# Patient Record
Sex: MALE | Race: WHITE | NOT HISPANIC OR LATINO | Employment: FULL TIME | URBAN - METROPOLITAN AREA
[De-identification: names, ages, dates, MRNs, and addresses within clinical notes are randomized per-mention and may not be internally consistent; named-entity substitution may affect disease eponyms.]

---

## 2023-06-29 LAB
ALANINE AMINOTRANSFERASE (SGPT) (U/L) IN SER/PLAS: 25 U/L (ref 10–52)
ALBUMIN (G/DL) IN SER/PLAS: 4.2 G/DL (ref 3.4–5)
ALBUMIN (MG/L) IN URINE: <7 MG/L
ALBUMIN/CREATININE (UG/MG) IN URINE: NORMAL UG/MG CRT (ref 0–30)
ALKALINE PHOSPHATASE (U/L) IN SER/PLAS: 43 U/L (ref 33–136)
ANION GAP IN SER/PLAS: 12 MMOL/L (ref 10–20)
APPEARANCE, URINE: CLEAR
ASPARTATE AMINOTRANSFERASE (SGOT) (U/L) IN SER/PLAS: 22 U/L (ref 9–39)
BASOPHILS (10*3/UL) IN BLOOD BY AUTOMATED COUNT: 0.07 X10E9/L (ref 0–0.1)
BASOPHILS/100 LEUKOCYTES IN BLOOD BY AUTOMATED COUNT: 0.8 % (ref 0–2)
BILIRUBIN TOTAL (MG/DL) IN SER/PLAS: 0.5 MG/DL (ref 0–1.2)
BILIRUBIN, URINE: NEGATIVE
BLOOD, URINE: NEGATIVE
CALCIDIOL (25 OH VITAMIN D3) (NG/ML) IN SER/PLAS: 49 NG/ML
CALCIUM (MG/DL) IN SER/PLAS: 9.5 MG/DL (ref 8.6–10.3)
CARBON DIOXIDE, TOTAL (MMOL/L) IN SER/PLAS: 28 MMOL/L (ref 21–32)
CHLORIDE (MMOL/L) IN SER/PLAS: 104 MMOL/L (ref 98–107)
CHOLESTEROL (MG/DL) IN SER/PLAS: 143 MG/DL (ref 0–199)
CHOLESTEROL IN HDL (MG/DL) IN SER/PLAS: 40.1 MG/DL
CHOLESTEROL/HDL RATIO: 3.6
COLOR, URINE: NORMAL
CREATININE (MG/DL) IN SER/PLAS: 0.96 MG/DL (ref 0.5–1.3)
CREATININE (MG/DL) IN URINE: 46.2 MG/DL (ref 20–370)
EOSINOPHILS (10*3/UL) IN BLOOD BY AUTOMATED COUNT: 0.17 X10E9/L (ref 0–0.4)
EOSINOPHILS/100 LEUKOCYTES IN BLOOD BY AUTOMATED COUNT: 2.1 % (ref 0–6)
ERYTHROCYTE DISTRIBUTION WIDTH (RATIO) BY AUTOMATED COUNT: 14.6 % (ref 11.5–14.5)
ERYTHROCYTE MEAN CORPUSCULAR HEMOGLOBIN CONCENTRATION (G/DL) BY AUTOMATED: 31.3 G/DL (ref 32–36)
ERYTHROCYTE MEAN CORPUSCULAR VOLUME (FL) BY AUTOMATED COUNT: 93 FL (ref 80–100)
ERYTHROCYTES (10*6/UL) IN BLOOD BY AUTOMATED COUNT: 5.1 X10E12/L (ref 4.5–5.9)
GFR MALE: 82 ML/MIN/1.73M2
GLUCOSE (MG/DL) IN SER/PLAS: 87 MG/DL (ref 74–99)
GLUCOSE, URINE: NEGATIVE MG/DL
HEMATOCRIT (%) IN BLOOD BY AUTOMATED COUNT: 47.3 % (ref 41–52)
HEMOGLOBIN (G/DL) IN BLOOD: 14.8 G/DL (ref 13.5–17.5)
IMMATURE GRANULOCYTES/100 LEUKOCYTES IN BLOOD BY AUTOMATED COUNT: 0.2 % (ref 0–0.9)
KETONES, URINE: NEGATIVE MG/DL
LDL: 83 MG/DL (ref 0–99)
LEUKOCYTE ESTERASE, URINE: NEGATIVE
LEUKOCYTES (10*3/UL) IN BLOOD BY AUTOMATED COUNT: 8.3 X10E9/L (ref 4.4–11.3)
LYMPHOCYTES (10*3/UL) IN BLOOD BY AUTOMATED COUNT: 2.67 X10E9/L (ref 0.8–3)
LYMPHOCYTES/100 LEUKOCYTES IN BLOOD BY AUTOMATED COUNT: 32.3 % (ref 13–44)
MONOCYTES (10*3/UL) IN BLOOD BY AUTOMATED COUNT: 0.72 X10E9/L (ref 0.05–0.8)
MONOCYTES/100 LEUKOCYTES IN BLOOD BY AUTOMATED COUNT: 8.7 % (ref 2–10)
NEUTROPHILS (10*3/UL) IN BLOOD BY AUTOMATED COUNT: 4.62 X10E9/L (ref 1.6–5.5)
NEUTROPHILS/100 LEUKOCYTES IN BLOOD BY AUTOMATED COUNT: 55.9 % (ref 40–80)
NITRITE, URINE: NEGATIVE
PH, URINE: 7 (ref 5–8)
PLATELETS (10*3/UL) IN BLOOD AUTOMATED COUNT: 187 X10E9/L (ref 150–450)
POTASSIUM (MMOL/L) IN SER/PLAS: 4.4 MMOL/L (ref 3.5–5.3)
PROTEIN TOTAL: 6.6 G/DL (ref 6.4–8.2)
PROTEIN, URINE: NEGATIVE MG/DL
SODIUM (MMOL/L) IN SER/PLAS: 140 MMOL/L (ref 136–145)
SPECIFIC GRAVITY, URINE: 1.01 (ref 1–1.03)
THYROTROPIN (MIU/L) IN SER/PLAS BY DETECTION LIMIT <= 0.05 MIU/L: 1.77 MIU/L (ref 0.44–3.98)
TRIGLYCERIDE (MG/DL) IN SER/PLAS: 100 MG/DL (ref 0–149)
UREA NITROGEN (MG/DL) IN SER/PLAS: 16 MG/DL (ref 6–23)
UROBILINOGEN, URINE: <2 MG/DL (ref 0–1.9)
VLDL: 20 MG/DL (ref 0–40)

## 2023-06-30 LAB
ESTIMATED AVERAGE GLUCOSE FOR HBA1C: 143 MG/DL
HEMOGLOBIN A1C/HEMOGLOBIN TOTAL IN BLOOD: 6.6 %

## 2023-07-08 LAB
PROSTATE SPECIFIC AG (NG/ML) IN SER/PLAS: 0.3 NG/ML (ref 0–4)
PROSTATE SPECIFIC AG FREE (NG/ML) IN SER/PLAS: 0.2 NG/ML
PROSTATE SPECIFIC AG FREE/PROSTATE SPECIFIC AG TOTAL IN SER/PLAS: 67 %

## 2023-09-28 LAB
ALBUMIN (MG/L) IN URINE: <7 MG/L
ALBUMIN/CREATININE (UG/MG) IN URINE: NORMAL UG/MG CRT (ref 0–30)
CREATININE (MG/DL) IN URINE: 134 MG/DL (ref 20–370)

## 2023-10-09 DIAGNOSIS — Z79.01 ANTICOAGULANT LONG-TERM USE: Primary | ICD-10-CM

## 2023-10-09 RX ORDER — WARFARIN SODIUM 5 MG/1
5 TABLET ORAL
COMMUNITY
Start: 2012-03-19 | End: 2023-10-09 | Stop reason: SDUPTHER

## 2023-10-09 RX ORDER — WARFARIN SODIUM 5 MG/1
5 TABLET ORAL EVERY EVENING
Qty: 90 TABLET | Refills: 0 | Status: SHIPPED | OUTPATIENT
Start: 2023-10-09 | End: 2023-10-31 | Stop reason: SDUPTHER

## 2023-10-29 PROBLEM — L71.9 ROSACEA: Status: ACTIVE | Noted: 2023-10-29

## 2023-10-29 PROBLEM — Z86.711 HISTORY OF PULMONARY EMBOLUS (PE): Status: ACTIVE | Noted: 2023-10-29

## 2023-10-29 PROBLEM — E66.9 CLASS 1 OBESITY WITH BODY MASS INDEX (BMI) OF 33.0 TO 33.9 IN ADULT: Status: ACTIVE | Noted: 2023-10-29

## 2023-10-29 PROBLEM — D68.51 FACTOR V LEIDEN MUTATION (MULTI): Status: ACTIVE | Noted: 2023-10-29

## 2023-10-29 PROBLEM — E66.811 CLASS 1 OBESITY WITH BODY MASS INDEX (BMI) OF 33.0 TO 33.9 IN ADULT: Status: ACTIVE | Noted: 2023-10-29

## 2023-10-29 PROBLEM — I10 HTN (HYPERTENSION): Status: ACTIVE | Noted: 2023-10-29

## 2023-10-29 PROBLEM — Q76.2 CONGENITAL SPONDYLOLISTHESIS OF LUMBAR REGION: Status: ACTIVE | Noted: 2023-10-29

## 2023-10-29 PROBLEM — E78.5 HYPERLIPIDEMIA: Status: ACTIVE | Noted: 2023-10-29

## 2023-10-29 PROBLEM — M17.9 OA (OSTEOARTHRITIS) OF KNEE: Status: ACTIVE | Noted: 2023-10-29

## 2023-10-29 PROBLEM — R91.8 LUNG NODULES: Status: ACTIVE | Noted: 2023-10-29

## 2023-10-29 PROBLEM — Z86.718 HISTORY OF DVT (DEEP VEIN THROMBOSIS): Status: ACTIVE | Noted: 2023-10-29

## 2023-10-29 PROBLEM — M51.26 LUMBAR HERNIATED DISC: Status: ACTIVE | Noted: 2023-10-29

## 2023-10-29 PROBLEM — K40.90 LEFT INGUINAL HERNIA: Status: ACTIVE | Noted: 2023-10-29

## 2023-10-29 RX ORDER — EPINEPHRINE 0.3 MG/.3ML
INJECTION INTRAMUSCULAR
COMMUNITY
Start: 2011-06-27 | End: 2024-06-05 | Stop reason: SDUPTHER

## 2023-10-29 RX ORDER — TIZANIDINE 4 MG/1
4 TABLET ORAL NIGHTLY PRN
COMMUNITY

## 2023-10-29 RX ORDER — KETOCONAZOLE 20 MG/ML
SHAMPOO, SUSPENSION TOPICAL
COMMUNITY
Start: 2021-06-29

## 2023-10-29 RX ORDER — CHOLECALCIFEROL (VITAMIN D3) 25 MCG
1000 TABLET ORAL DAILY
COMMUNITY

## 2023-10-29 RX ORDER — LORATADINE 10 MG/1
10 TABLET ORAL DAILY
COMMUNITY

## 2023-10-29 RX ORDER — LOSARTAN POTASSIUM 25 MG/1
TABLET ORAL
COMMUNITY
Start: 2023-02-08

## 2023-10-29 RX ORDER — PREGABALIN 75 MG/1
CAPSULE ORAL
COMMUNITY

## 2023-10-29 RX ORDER — ATORVASTATIN CALCIUM 20 MG/1
TABLET, FILM COATED ORAL
COMMUNITY
Start: 2012-10-02 | End: 2024-03-26 | Stop reason: SDUPTHER

## 2023-10-29 RX ORDER — TAPENTADOL HYDROCHLORIDE 50 MG/1
TABLET, FILM COATED ORAL
COMMUNITY
Start: 2023-08-17 | End: 2023-11-21 | Stop reason: ALTCHOICE

## 2023-10-29 RX ORDER — FUROSEMIDE 20 MG/1
TABLET ORAL
COMMUNITY
End: 2024-01-24 | Stop reason: SDUPTHER

## 2023-10-29 RX ORDER — FLUTICASONE PROPIONATE 50 MCG
SPRAY, SUSPENSION (ML) NASAL
COMMUNITY

## 2023-10-29 RX ORDER — NALOXONE HYDROCHLORIDE 4 MG/.1ML
SPRAY NASAL
COMMUNITY

## 2023-10-29 RX ORDER — ASPIRIN 81 MG/1
81 TABLET ORAL
COMMUNITY
Start: 2011-05-09

## 2023-10-31 ENCOUNTER — OFFICE VISIT (OUTPATIENT)
Dept: PRIMARY CARE | Facility: CLINIC | Age: 78
End: 2023-10-31
Payer: MEDICARE

## 2023-10-31 VITALS
DIASTOLIC BLOOD PRESSURE: 70 MMHG | HEART RATE: 73 BPM | SYSTOLIC BLOOD PRESSURE: 136 MMHG | BODY MASS INDEX: 33.72 KG/M2 | WEIGHT: 235 LBS | TEMPERATURE: 97.9 F

## 2023-10-31 DIAGNOSIS — Z86.711 HISTORY OF PULMONARY EMBOLUS (PE): ICD-10-CM

## 2023-10-31 DIAGNOSIS — I48.0 PAROXYSMAL ATRIAL FIBRILLATION (MULTI): Primary | ICD-10-CM

## 2023-10-31 DIAGNOSIS — Z79.01 ANTICOAGULANT LONG-TERM USE: ICD-10-CM

## 2023-10-31 LAB
POC INR: 3
POC PROTHROMBIN TIME: NORMAL

## 2023-10-31 PROCEDURE — 1160F RVW MEDS BY RX/DR IN RCRD: CPT | Performed by: NURSE PRACTITIONER

## 2023-10-31 PROCEDURE — 85610 PROTHROMBIN TIME: CPT | Performed by: NURSE PRACTITIONER

## 2023-10-31 PROCEDURE — 3078F DIAST BP <80 MM HG: CPT | Performed by: NURSE PRACTITIONER

## 2023-10-31 PROCEDURE — 1159F MED LIST DOCD IN RCRD: CPT | Performed by: NURSE PRACTITIONER

## 2023-10-31 PROCEDURE — 1036F TOBACCO NON-USER: CPT | Performed by: NURSE PRACTITIONER

## 2023-10-31 PROCEDURE — 99213 OFFICE O/P EST LOW 20 MIN: CPT | Performed by: NURSE PRACTITIONER

## 2023-10-31 PROCEDURE — 3075F SYST BP GE 130 - 139MM HG: CPT | Performed by: NURSE PRACTITIONER

## 2023-10-31 ASSESSMENT — ENCOUNTER SYMPTOMS
RESPIRATORY NEGATIVE: 1
GASTROINTESTINAL NEGATIVE: 1
CONSTITUTIONAL NEGATIVE: 1
CARDIOVASCULAR NEGATIVE: 1

## 2023-10-31 NOTE — PROGRESS NOTES
Subjective   Patient ID: Anurag Cummings Jr. is a 78 y.o. male.    HPI  Here for inr check  Last inr was 2.9 on warfarin 7.5mg daily  He adjusted diet and 1 day hold then went back to 7.5mg all days  No cp, sob, pressure, pain or swelling and no bleeding  He has been in contact with dr reyes [facility] his cards for his   Lone event of afib and has not had any of those s/s since    Inr today is 3.0  Will eat more greens in diet and inr check 3 weeks     Lab Results   Component Value Date    INR 3.00 10/31/2023    /70   Pulse 73   Temp 36.6 °C (97.9 °F)   Wt 107 kg (235 lb)   BMI 33.72 kg/m²      Review of Systems   Constitutional: Negative.    Respiratory: Negative.     Cardiovascular: Negative.    Gastrointestinal: Negative.    Skin: Negative.        Objective   Physical Exam  Vitals and nursing note reviewed.   Constitutional:       Appearance: Normal appearance.   HENT:      Head: Normocephalic.   Eyes:      Pupils: Pupils are equal, round, and reactive to light.   Cardiovascular:      Rate and Rhythm: Normal rate and regular rhythm.      Heart sounds: Normal heart sounds.   Pulmonary:      Effort: Pulmonary effort is normal.      Breath sounds: Normal breath sounds.   Skin:     General: Skin is warm and dry.   Neurological:      General: No focal deficit present.      Mental Status: He is alert and oriented to person, place, and time. Mental status is at baseline.   Psychiatric:         Mood and Affect: Mood normal.         Behavior: Behavior normal.         Thought Content: Thought content normal.         Judgment: Judgment normal.     Problem List Items Addressed This Visit             ICD-10-CM    History of pulmonary embolus (PE) Z86.711     Other Visit Diagnoses         Codes    Paroxysmal atrial fibrillation (CMS/HCC)    -  Primary I48.0

## 2023-11-07 RX ORDER — WARFARIN SODIUM 5 MG/1
TABLET ORAL
Qty: 90 TABLET | Refills: 0 | Status: SHIPPED | OUTPATIENT
Start: 2023-11-07 | End: 2024-01-10

## 2023-11-21 ENCOUNTER — OFFICE VISIT (OUTPATIENT)
Dept: PRIMARY CARE | Facility: CLINIC | Age: 78
End: 2023-11-21
Payer: MEDICARE

## 2023-11-21 VITALS
WEIGHT: 236 LBS | RESPIRATION RATE: 16 BRPM | DIASTOLIC BLOOD PRESSURE: 80 MMHG | BODY MASS INDEX: 33.86 KG/M2 | HEART RATE: 66 BPM | SYSTOLIC BLOOD PRESSURE: 120 MMHG | TEMPERATURE: 98.5 F

## 2023-11-21 DIAGNOSIS — Z86.711 HISTORY OF PULMONARY EMBOLUS (PE): ICD-10-CM

## 2023-11-21 DIAGNOSIS — Z79.01 ANTICOAGULANT LONG-TERM USE: ICD-10-CM

## 2023-11-21 DIAGNOSIS — I48.0 PAROXYSMAL ATRIAL FIBRILLATION (MULTI): ICD-10-CM

## 2023-11-21 DIAGNOSIS — Z86.718 HISTORY OF DVT (DEEP VEIN THROMBOSIS): ICD-10-CM

## 2023-11-21 DIAGNOSIS — D68.59 HYPERCOAGULABLE STATE (MULTI): Primary | ICD-10-CM

## 2023-11-21 LAB
POC INR: 3.1
POC PROTHROMBIN TIME: NORMAL

## 2023-11-21 PROCEDURE — 3074F SYST BP LT 130 MM HG: CPT | Performed by: NURSE PRACTITIONER

## 2023-11-21 PROCEDURE — 1160F RVW MEDS BY RX/DR IN RCRD: CPT | Performed by: NURSE PRACTITIONER

## 2023-11-21 PROCEDURE — 3079F DIAST BP 80-89 MM HG: CPT | Performed by: NURSE PRACTITIONER

## 2023-11-21 PROCEDURE — 99213 OFFICE O/P EST LOW 20 MIN: CPT | Performed by: NURSE PRACTITIONER

## 2023-11-21 PROCEDURE — 1036F TOBACCO NON-USER: CPT | Performed by: NURSE PRACTITIONER

## 2023-11-21 PROCEDURE — 85610 PROTHROMBIN TIME: CPT | Performed by: NURSE PRACTITIONER

## 2023-11-21 PROCEDURE — 1159F MED LIST DOCD IN RCRD: CPT | Performed by: NURSE PRACTITIONER

## 2023-11-21 ASSESSMENT — ENCOUNTER SYMPTOMS
NEUROLOGICAL NEGATIVE: 1
PSYCHIATRIC NEGATIVE: 1
CARDIOVASCULAR NEGATIVE: 1
RESPIRATORY NEGATIVE: 1
CONSTITUTIONAL NEGATIVE: 1
GASTROINTESTINAL NEGATIVE: 1

## 2023-11-21 NOTE — PROGRESS NOTES
Subjective   Patient ID: Anurag Cummings Jr. is a 78 y.o. male.    HPI  Feels well  Here for inr check  Today inr is elevated he says d/t holiday/get togethers  No cp, sob, pressure, pain or swelling  He will take 1/2 dose today and return to his 7.5mg daily  Has had no greens in diet will readd  Will follow up one month    Lab Results   Component Value Date    INR 3.10 11/21/2023    INR 3.00 10/31/2023      Review of Systems   Constitutional: Negative.    Respiratory: Negative.     Cardiovascular: Negative.    Gastrointestinal: Negative.    Skin: Negative.    Neurological: Negative.    Psychiatric/Behavioral: Negative.       Visit Vitals  /80   Pulse 66   Temp 36.9 °C (98.5 °F)   Resp 16      Vitals:    11/21/23 1358   Weight: 107 kg (236 lb)        Objective   Physical Exam  Vitals and nursing note reviewed.   Constitutional:       Appearance: Normal appearance.   HENT:      Head: Normocephalic.   Eyes:      Pupils: Pupils are equal, round, and reactive to light.   Cardiovascular:      Rate and Rhythm: Normal rate and regular rhythm.      Heart sounds: Normal heart sounds.   Pulmonary:      Effort: Pulmonary effort is normal.      Breath sounds: Normal breath sounds.   Skin:     General: Skin is warm and dry.   Neurological:      General: No focal deficit present.      Mental Status: He is alert and oriented to person, place, and time. Mental status is at baseline.   Psychiatric:         Mood and Affect: Mood normal.         Behavior: Behavior normal.         Thought Content: Thought content normal.         Judgment: Judgment normal.     Problem List Items Addressed This Visit             ICD-10-CM    History of DVT (deep vein thrombosis) Z86.718    History of pulmonary embolus (PE) Z86.711     Other Visit Diagnoses         Codes    Hypercoagulable state (CMS/HCC)    -  Primary D68.59    Paroxysmal atrial fibrillation (CMS/HCC)     I48.0    Anticoagulant long-term use     Z79.01

## 2023-11-21 NOTE — PROGRESS NOTES
Anurag Cummings  is a 78 y.o. here for a diabetic follow up exam. inr    Pt states they are doing well. Following a low carbohydrate diet and is active.     Up to date with eye and foot exams, pcp visits.       Hold 1/2 tablet and then back to 7.5mg all days   Weight 139    Visit Vitals  /80   Pulse 66   Temp 36.9 °C (98.5 °F)   Resp 16    .weight  Lab Results   Component Value Date    HGBA1C 6.6 (A) 06/29/2023   (  Vitals:    11/21/23 1358   Weight: 107 kg (236 lb)      Visit Vitals  /80   Pulse 66   Temp 36.9 °C (98.5 °F)   Resp 16        Current Outpatient Medications on File Prior to Visit   Medication Sig Dispense Refill    aspirin 81 mg EC tablet Take 1 tablet (81 mg) by mouth once daily.      atorvastatin (Lipitor) 20 mg tablet       cholecalciferol (Vitamin D-3) 25 MCG (1000 UT) tablet Take 1 tablet (1,000 Units) by mouth once daily.      EPINEPHrine (EpiPen 2-Reid) 0.3 mg/0.3 mL injection syringe As directed      fluticasone (Flonase) 50 mcg/actuation nasal spray Flonase      furosemide (Lasix) 20 mg tablet take one tablet by mouth on mon, wed, and fri      loratadine (Claritin) 10 mg tablet Take 1 tablet (10 mg) by mouth once daily.      losartan (Cozaar) 25 mg tablet take 1 tablet by mouth in the am and 1 tablet in pm d/c 50mg      metoprolol succinate XL (Kapspargo Sprinkle) 25 mg 24 hr capsule Take 1 capsule (25 mg) by mouth once daily.      naloxone (Narcan) 4 mg/0.1 mL nasal spray take 0.1 ml Nasally call 911 immediately, when breathing is severely diminished. may repeat in 3 minutes if necessary for 30 days      pregabalin (Lyrica) 75 mg capsule TAKE 1 CAPSULE BY MOUTH ONCE DAILY AT BEDTIME      tapentadol ER (Nucynta ER) 50 mg 12 hr tablet Take 1 tablet (50 mg) by mouth once daily.      tiZANidine (Zanaflex) 4 mg tablet Take 1 tablet (4 mg) by mouth as needed at bedtime.      warfarin (Coumadin) 5 mg tablet 1.5 tabs daily =7.5 total daily dose 90 tablet 0    ketoconazole (NIZOral)  2 % shampoo        [DISCONTINUED] Nucynta 50 mg tablet TAKE 1 TABLET BY MOUTH EVERY 8 TO 12 HOURS AS NEEDED       No current facility-administered medications on file prior to visit.      Review of Systems   Physical Exam     Assessment and Plan:  No problem-specific Assessment & Plan notes found for this encounter.     Problem List Items Addressed This Visit    None

## 2023-12-21 ENCOUNTER — OFFICE VISIT (OUTPATIENT)
Dept: PRIMARY CARE | Facility: CLINIC | Age: 78
End: 2023-12-21
Payer: MEDICARE

## 2023-12-21 VITALS
DIASTOLIC BLOOD PRESSURE: 84 MMHG | TEMPERATURE: 97.6 F | BODY MASS INDEX: 33.86 KG/M2 | SYSTOLIC BLOOD PRESSURE: 130 MMHG | WEIGHT: 236 LBS | HEART RATE: 66 BPM

## 2023-12-21 DIAGNOSIS — D68.59 HYPERCOAGULABLE STATE (MULTI): ICD-10-CM

## 2023-12-21 DIAGNOSIS — Z79.01 ANTICOAGULANT LONG-TERM USE: Primary | ICD-10-CM

## 2023-12-21 LAB
POC INR: 3.2
POC PROTHROMBIN TIME: NORMAL

## 2023-12-21 PROCEDURE — 85610 PROTHROMBIN TIME: CPT | Performed by: NURSE PRACTITIONER

## 2023-12-21 PROCEDURE — 3079F DIAST BP 80-89 MM HG: CPT | Performed by: NURSE PRACTITIONER

## 2023-12-21 PROCEDURE — 1159F MED LIST DOCD IN RCRD: CPT | Performed by: NURSE PRACTITIONER

## 2023-12-21 PROCEDURE — 99213 OFFICE O/P EST LOW 20 MIN: CPT | Performed by: NURSE PRACTITIONER

## 2023-12-21 PROCEDURE — 1036F TOBACCO NON-USER: CPT | Performed by: NURSE PRACTITIONER

## 2023-12-21 PROCEDURE — 3075F SYST BP GE 130 - 139MM HG: CPT | Performed by: NURSE PRACTITIONER

## 2023-12-21 PROCEDURE — 1160F RVW MEDS BY RX/DR IN RCRD: CPT | Performed by: NURSE PRACTITIONER

## 2023-12-21 ASSESSMENT — ENCOUNTER SYMPTOMS
CARDIOVASCULAR NEGATIVE: 1
RESPIRATORY NEGATIVE: 1
GASTROINTESTINAL NEGATIVE: 1
ENDOCRINE NEGATIVE: 1
CONSTITUTIONAL NEGATIVE: 1
NEUROLOGICAL NEGATIVE: 1
PSYCHIATRIC NEGATIVE: 1

## 2023-12-21 NOTE — PROGRESS NOTES
Anurag Leavittjose luis Carver. male  78 y.o. for long term use of anticoagulation visit.    Review of Systems   Constitutional: Negative.    Respiratory: Negative.     Cardiovascular: Negative.    Gastrointestinal: Negative.    Endocrine: Negative.    Skin: Negative.    Neurological: Negative.    Psychiatric/Behavioral: Negative.        HPI   Here for inr  Check. Inr today is 3.2  Last inr 3.1 (3.0)  Warfarin 7.5mg all days  No cp, sob, pressure, pain or swelling and no bleeding     He plans on eating 2-3 more servings of greens weekly and follow up in 3-4 weeks. He has had no greens over last few weeks.        Physical Exam  Vitals and nursing note reviewed.   Constitutional:       Appearance: Normal appearance.   HENT:      Head: Normocephalic.   Eyes:      Pupils: Pupils are equal, round, and reactive to light.   Cardiovascular:      Rate and Rhythm: Normal rate and regular rhythm.      Heart sounds: Normal heart sounds.   Pulmonary:      Effort: Pulmonary effort is normal.      Breath sounds: Normal breath sounds.   Skin:     General: Skin is warm and dry.   Neurological:      General: No focal deficit present.      Mental Status: He is alert and oriented to person, place, and time. Mental status is at baseline.   Psychiatric:         Mood and Affect: Mood normal.         Behavior: Behavior normal.         Thought Content: Thought content normal.         Judgment: Judgment normal.        Visit Vitals  /84   Pulse 66   Temp 36.4 °C (97.6 °F)      Vitals:    12/21/23 1315   Weight: 107 kg (236 lb)        History reviewed. No pertinent past medical history.     Past Surgical History:   Procedure Laterality Date    OTHER SURGICAL HISTORY  10/01/2020    Umbilical hernia repair    OTHER SURGICAL HISTORY  10/01/2020    Eye surgery    OTHER SURGICAL HISTORY  10/01/2020    Mely filter placement    OTHER SURGICAL HISTORY  10/01/2020    Tonsillectomy    OTHER SURGICAL HISTORY  10/01/2020    Cataract surgery    OTHER  SURGICAL HISTORY  11/24/2020    Knee surgery        Anticoagulation Episode Summary       Current INR goal:     TTR:  --   Next INR check:     INR from last check:  3.20 (12/21/2023)   Weekly max warfarin dose:     Target end date:     INR check location:     Preferred lab:     Send INR reminders to:         Comments:                Problem List Items Addressed This Visit    None  Visit Diagnoses         Codes    Anticoagulant long-term use    -  Primary Z79.01    Hypercoagulable state (CMS/HCC)     D68.59    Relevant Orders    POCT INR manually resulted (Completed)

## 2024-01-10 DIAGNOSIS — Z79.01 ANTICOAGULANT LONG-TERM USE: ICD-10-CM

## 2024-01-10 RX ORDER — WARFARIN SODIUM 5 MG/1
TABLET ORAL
Qty: 90 TABLET | Refills: 3 | Status: SHIPPED | OUTPATIENT
Start: 2024-01-10 | End: 2024-03-26 | Stop reason: SDUPTHER

## 2024-01-24 ENCOUNTER — OFFICE VISIT (OUTPATIENT)
Dept: PRIMARY CARE | Facility: CLINIC | Age: 79
End: 2024-01-24
Payer: MEDICARE

## 2024-01-24 VITALS
TEMPERATURE: 98.5 F | HEART RATE: 68 BPM | SYSTOLIC BLOOD PRESSURE: 150 MMHG | DIASTOLIC BLOOD PRESSURE: 72 MMHG | BODY MASS INDEX: 34.15 KG/M2 | WEIGHT: 238 LBS

## 2024-01-24 DIAGNOSIS — R73.03 PREDIABETES: ICD-10-CM

## 2024-01-24 DIAGNOSIS — D68.59 HYPERCOAGULABLE STATE (MULTI): Primary | ICD-10-CM

## 2024-01-24 LAB
POC FINGERSTICK BLOOD GLUCOSE: 84 MG/DL (ref 70–100)
POC HEMOGLOBIN A1C: 6.5 % (ref 4.2–6.5)
POC INR: 3.6
POC PROTHROMBIN TIME: NORMAL

## 2024-01-24 PROCEDURE — 83036 HEMOGLOBIN GLYCOSYLATED A1C: CPT | Performed by: NURSE PRACTITIONER

## 2024-01-24 PROCEDURE — 3077F SYST BP >= 140 MM HG: CPT | Performed by: NURSE PRACTITIONER

## 2024-01-24 PROCEDURE — 99214 OFFICE O/P EST MOD 30 MIN: CPT | Performed by: NURSE PRACTITIONER

## 2024-01-24 PROCEDURE — 3078F DIAST BP <80 MM HG: CPT | Performed by: NURSE PRACTITIONER

## 2024-01-24 PROCEDURE — 1159F MED LIST DOCD IN RCRD: CPT | Performed by: NURSE PRACTITIONER

## 2024-01-24 PROCEDURE — 1036F TOBACCO NON-USER: CPT | Performed by: NURSE PRACTITIONER

## 2024-01-24 PROCEDURE — 85610 PROTHROMBIN TIME: CPT | Performed by: NURSE PRACTITIONER

## 2024-01-24 PROCEDURE — 82962 GLUCOSE BLOOD TEST: CPT | Performed by: NURSE PRACTITIONER

## 2024-01-24 RX ORDER — FUROSEMIDE 20 MG/1
TABLET ORAL
Qty: 90 TABLET | Refills: 3 | Status: SHIPPED | OUTPATIENT
Start: 2024-01-24

## 2024-01-24 NOTE — PROGRESS NOTES
Anurag Cummings . is a 78 y.o. here for a diabetic follow up exam.     Pt states they are doing well. Following a low carbohydrate diet and is active.     Up to date with eye and foot exams, pcp visits.     Inr ntoday 3.6 hold today then back to   7.5mg all days except 5mg on Sundays  Follow up one month   On decong and will nwatch the b/p   Visit Vitals  /72   Pulse 68   Temp 36.9 °C (98.5 °F)        Lab Results   Component Value Date    POCGLU 84 01/24/2024    HGBA1C 6.5 01/24/2024    HGBA1C 6.6 (A) 06/29/2023   (  [unfilled]   Visit Vitals  /72   Pulse 68   Temp 36.9 °C (98.5 °F)        Current Outpatient Medications on File Prior to Visit   Medication Sig Dispense Refill    aspirin 81 mg EC tablet Take 1 tablet (81 mg) by mouth once daily.      atorvastatin (Lipitor) 20 mg tablet       cholecalciferol (Vitamin D-3) 25 MCG (1000 UT) tablet Take 1 tablet (1,000 Units) by mouth once daily.      EPINEPHrine (EpiPen 2-Reid) 0.3 mg/0.3 mL injection syringe As directed      fluticasone (Flonase) 50 mcg/actuation nasal spray Flonase      furosemide (Lasix) 20 mg tablet take one tablet by mouth on mon, wed, and fri      ketoconazole (NIZOral) 2 % shampoo        loratadine (Claritin) 10 mg tablet Take 1 tablet (10 mg) by mouth once daily.      losartan (Cozaar) 25 mg tablet take 1 tablet by mouth in the am and 1 tablet in pm d/c 50mg      metoprolol succinate XL (Kapspargo Sprinkle) 25 mg 24 hr capsule Take 1 capsule (25 mg) by mouth once daily.      naloxone (Narcan) 4 mg/0.1 mL nasal spray take 0.1 ml Nasally call 911 immediately, when breathing is severely diminished. may repeat in 3 minutes if necessary for 30 days      pregabalin (Lyrica) 75 mg capsule TAKE 1 CAPSULE BY MOUTH ONCE DAILY AT BEDTIME      tapentadol ER (Nucynta ER) 50 mg 12 hr tablet Take 1 tablet (50 mg) by mouth once daily.      tiZANidine (Zanaflex) 4 mg tablet Take 1 tablet (4 mg) by mouth as needed at bedtime.      warfarin  (Coumadin) 5 mg tablet TAKE 1 TABLET DAILY IN THE EVENING 90 tablet 3     No current facility-administered medications on file prior to visit.      Review of Systems     Physical Exam     Assessment and Plan:  No problem-specific Assessment & Plan notes found for this encounter.     Problem List Items Addressed This Visit    None  Visit Diagnoses         Codes    Hypercoagulable state (CMS/[facility])    -  Primary D68.59    Relevant Orders    POCT INR manually resulted    POCT INR manually resulted (Completed)    Prediabetes     R73.03    Relevant Orders    POCT Fingerstick Glucose manually resulted (Completed)    POCT glycosylated hemoglobin (Hb A1C) manually resulted (Completed)    POCT INR manually resulted (Completed)

## 2024-02-23 ENCOUNTER — OFFICE VISIT (OUTPATIENT)
Dept: PRIMARY CARE | Facility: CLINIC | Age: 79
End: 2024-02-23
Payer: MEDICARE

## 2024-02-23 VITALS
TEMPERATURE: 97.4 F | HEART RATE: 70 BPM | RESPIRATION RATE: 16 BRPM | SYSTOLIC BLOOD PRESSURE: 122 MMHG | OXYGEN SATURATION: 97 % | BODY MASS INDEX: 32.71 KG/M2 | DIASTOLIC BLOOD PRESSURE: 80 MMHG | WEIGHT: 228 LBS

## 2024-02-23 DIAGNOSIS — Z86.711 HISTORY OF PULMONARY EMBOLUS (PE): Primary | ICD-10-CM

## 2024-02-23 DIAGNOSIS — Z86.718 HISTORY OF DVT (DEEP VEIN THROMBOSIS): ICD-10-CM

## 2024-02-23 DIAGNOSIS — Z79.01 ANTICOAGULANT LONG-TERM USE: ICD-10-CM

## 2024-02-23 LAB
POC INR: 2
POC PROTHROMBIN TIME: NORMAL

## 2024-02-23 PROCEDURE — 3079F DIAST BP 80-89 MM HG: CPT | Performed by: NURSE PRACTITIONER

## 2024-02-23 PROCEDURE — 1036F TOBACCO NON-USER: CPT | Performed by: NURSE PRACTITIONER

## 2024-02-23 PROCEDURE — 3074F SYST BP LT 130 MM HG: CPT | Performed by: NURSE PRACTITIONER

## 2024-02-23 PROCEDURE — 1160F RVW MEDS BY RX/DR IN RCRD: CPT | Performed by: NURSE PRACTITIONER

## 2024-02-23 PROCEDURE — 1159F MED LIST DOCD IN RCRD: CPT | Performed by: NURSE PRACTITIONER

## 2024-02-23 PROCEDURE — 99213 OFFICE O/P EST LOW 20 MIN: CPT | Performed by: NURSE PRACTITIONER

## 2024-02-23 PROCEDURE — 85610 PROTHROMBIN TIME: CPT | Performed by: NURSE PRACTITIONER

## 2024-02-23 ASSESSMENT — ENCOUNTER SYMPTOMS
PSYCHIATRIC NEGATIVE: 1
NEUROLOGICAL NEGATIVE: 1
ENDOCRINE NEGATIVE: 1
MUSCULOSKELETAL NEGATIVE: 1
GASTROINTESTINAL NEGATIVE: 1
CONSTITUTIONAL NEGATIVE: 1
CARDIOVASCULAR NEGATIVE: 1
RESPIRATORY NEGATIVE: 1

## 2024-02-23 NOTE — PROGRESS NOTES
Anurag Cummings Jr. male  78 y.o. for long term use of anticoagulation visit.     HPI   Here for inr check  Feels well no cp, sob, pressure, pain or swelling   No bleeding  Has seen cards and utd there  Last inr 3.2  He held then back to 7.5mg all days except Sunday no dose  Put greens back into his diet  1/24 aic 6.5  Inr today 2.10  Going to [place] coming back one week     Visit Vitals  /80   Pulse 70   Temp 36.3 °C (97.4 °F) (Temporal)   Resp 16         Review of Systems   Constitutional: Negative.    HENT: Negative.     Respiratory: Negative.     Cardiovascular: Negative.    Gastrointestinal: Negative.    Endocrine: Negative.    Genitourinary: Negative.    Musculoskeletal: Negative.    Skin: Negative.    Neurological: Negative.    Psychiatric/Behavioral: Negative.         Physical Exam  Vitals and nursing note reviewed.   Constitutional:       Appearance: Normal appearance.   HENT:      Head: Normocephalic.   Eyes:      Pupils: Pupils are equal, round, and reactive to light.   Cardiovascular:      Rate and Rhythm: Normal rate and regular rhythm.      Heart sounds: Normal heart sounds.   Pulmonary:      Effort: Pulmonary effort is normal.      Breath sounds: Normal breath sounds.   Skin:     General: Skin is warm and dry.   Neurological:      General: No focal deficit present.      Mental Status: He is alert and oriented to person, place, and time. Mental status is at baseline.   Psychiatric:         Mood and Affect: Mood normal.         Behavior: Behavior normal.         Thought Content: Thought content normal.         Judgment: Judgment normal.      Problem List Items Addressed This Visit             ICD-10-CM    History of DVT (deep vein thrombosis) Z86.718    History of pulmonary embolus (PE) - Primary Z86.711     Other Visit Diagnoses         Codes    Anticoagulant long-term use     Z79.01           There were no vitals taken for this visit. There were no vitals filed for this visit.      Anticoagulation Episode Summary       Current INR goal:     TTR:  --   Next INR check:     INR from last check:  3.60 (1/24/2024)   Weekly max warfarin dose:     Target end date:     INR check location:     Preferred lab:     Send INR reminders to:         Comments:                Problem List Items Addressed This Visit             ICD-10-CM    History of DVT (deep vein thrombosis) Z86.718    History of pulmonary embolus (PE) - Primary Z86.711     Other Visit Diagnoses         Codes    Anticoagulant long-term use     Z79.01

## 2024-03-26 ENCOUNTER — OFFICE VISIT (OUTPATIENT)
Dept: PRIMARY CARE | Facility: CLINIC | Age: 79
End: 2024-03-26
Payer: MEDICARE

## 2024-03-26 VITALS
SYSTOLIC BLOOD PRESSURE: 122 MMHG | HEART RATE: 64 BPM | WEIGHT: 228 LBS | RESPIRATION RATE: 16 BRPM | BODY MASS INDEX: 32.71 KG/M2 | DIASTOLIC BLOOD PRESSURE: 80 MMHG | TEMPERATURE: 97.2 F

## 2024-03-26 DIAGNOSIS — Z79.01 ANTICOAGULANT LONG-TERM USE: Primary | ICD-10-CM

## 2024-03-26 DIAGNOSIS — D68.59 HYPERCOAGULABLE STATE (MULTI): ICD-10-CM

## 2024-03-26 DIAGNOSIS — E78.5 HYPERLIPIDEMIA, UNSPECIFIED HYPERLIPIDEMIA TYPE: ICD-10-CM

## 2024-03-26 LAB
POC INR: 2.4
POC PROTHROMBIN TIME: NORMAL

## 2024-03-26 PROCEDURE — 85610 PROTHROMBIN TIME: CPT | Performed by: NURSE PRACTITIONER

## 2024-03-26 PROCEDURE — 3074F SYST BP LT 130 MM HG: CPT | Performed by: NURSE PRACTITIONER

## 2024-03-26 PROCEDURE — 99213 OFFICE O/P EST LOW 20 MIN: CPT | Performed by: NURSE PRACTITIONER

## 2024-03-26 PROCEDURE — 1160F RVW MEDS BY RX/DR IN RCRD: CPT | Performed by: NURSE PRACTITIONER

## 2024-03-26 PROCEDURE — 1036F TOBACCO NON-USER: CPT | Performed by: NURSE PRACTITIONER

## 2024-03-26 PROCEDURE — 3079F DIAST BP 80-89 MM HG: CPT | Performed by: NURSE PRACTITIONER

## 2024-03-26 PROCEDURE — 1159F MED LIST DOCD IN RCRD: CPT | Performed by: NURSE PRACTITIONER

## 2024-03-26 RX ORDER — WARFARIN SODIUM 5 MG/1
TABLET ORAL
Qty: 90 TABLET | Refills: 3 | Status: SHIPPED | OUTPATIENT
Start: 2024-03-26

## 2024-03-26 RX ORDER — ATORVASTATIN CALCIUM 20 MG/1
TABLET, FILM COATED ORAL
Qty: 90 TABLET | Refills: 3 | Status: SHIPPED | OUTPATIENT
Start: 2024-03-26

## 2024-03-26 ASSESSMENT — ENCOUNTER SYMPTOMS
RESPIRATORY NEGATIVE: 1
CONSTITUTIONAL NEGATIVE: 1
PSYCHIATRIC NEGATIVE: 1
ENDOCRINE NEGATIVE: 1
MUSCULOSKELETAL NEGATIVE: 1
NEUROLOGICAL NEGATIVE: 1
CARDIOVASCULAR NEGATIVE: 1
GASTROINTESTINAL NEGATIVE: 1

## 2024-03-26 NOTE — PROGRESS NOTES
Subjective   Patient ID: Anurag Cummings Jr. is a 78 y.o. male.    HPI  Inr check 2.4  No changes  Warfarin continues at 7.5mg all days  No cp, sob, pressure, pain or swelling no palps  Follow up 4 weeks inr    Review of Systems   Constitutional: Negative.    HENT: Negative.     Respiratory: Negative.     Cardiovascular: Negative.    Gastrointestinal: Negative.    Endocrine: Negative.    Genitourinary: Negative.    Musculoskeletal: Negative.    Skin: Negative.    Neurological: Negative.    Psychiatric/Behavioral: Negative.     Visit Vitals  /80   Pulse 64   Temp 36.2 °C (97.2 °F) (Temporal)   Resp 16        Objective   Physical Exam  Vitals and nursing note reviewed.   Constitutional:       Appearance: Normal appearance.   HENT:      Head: Normocephalic.   Eyes:      Pupils: Pupils are equal, round, and reactive to light.   Cardiovascular:      Rate and Rhythm: Normal rate and regular rhythm.      Heart sounds: Normal heart sounds.   Pulmonary:      Effort: Pulmonary effort is normal.      Breath sounds: Normal breath sounds.   Skin:     General: Skin is warm and dry.   Neurological:      General: No focal deficit present.      Mental Status: He is alert and oriented to person, place, and time. Mental status is at baseline.   Psychiatric:         Mood and Affect: Mood normal.         Behavior: Behavior normal.         Thought Content: Thought content normal.         Judgment: Judgment normal.       Problem List Items Addressed This Visit             ICD-10-CM    Hyperlipidemia E78.5    Relevant Medications    atorvastatin (Lipitor) 20 mg tablet     Other Visit Diagnoses         Codes    Anticoagulant long-term use    -  Primary Z79.01    Relevant Medications    warfarin (Coumadin) 5 mg tablet    metoprolol succinate XL (Kapspargo Sprinkle) 25 mg 24 hr capsule    Hypercoagulable state (CMS/HCC)     D68.59

## 2024-04-22 ENCOUNTER — OFFICE VISIT (OUTPATIENT)
Dept: PRIMARY CARE | Facility: CLINIC | Age: 79
End: 2024-04-22
Payer: MEDICARE

## 2024-04-22 VITALS
WEIGHT: 226 LBS | HEIGHT: 70 IN | TEMPERATURE: 97.8 F | BODY MASS INDEX: 32.35 KG/M2 | SYSTOLIC BLOOD PRESSURE: 130 MMHG | DIASTOLIC BLOOD PRESSURE: 80 MMHG | HEART RATE: 62 BPM

## 2024-04-22 DIAGNOSIS — D68.9 COAGULATION DEFECT (MULTI): ICD-10-CM

## 2024-04-22 DIAGNOSIS — E11.65 TYPE 2 DIABETES MELLITUS WITH HYPERGLYCEMIA, WITHOUT LONG-TERM CURRENT USE OF INSULIN (MULTI): Primary | ICD-10-CM

## 2024-04-22 LAB
POC FINGERSTICK BLOOD GLUCOSE: 84 MG/DL (ref 70–100)
POC HEMOGLOBIN A1C: 6.1 % (ref 4.2–6.5)
POC INR: 1.5
POC PROTHROMBIN TIME: NORMAL

## 2024-04-22 PROCEDURE — 1160F RVW MEDS BY RX/DR IN RCRD: CPT | Performed by: NURSE PRACTITIONER

## 2024-04-22 PROCEDURE — 82962 GLUCOSE BLOOD TEST: CPT | Performed by: NURSE PRACTITIONER

## 2024-04-22 PROCEDURE — 99214 OFFICE O/P EST MOD 30 MIN: CPT | Performed by: NURSE PRACTITIONER

## 2024-04-22 PROCEDURE — 83036 HEMOGLOBIN GLYCOSYLATED A1C: CPT | Performed by: NURSE PRACTITIONER

## 2024-04-22 PROCEDURE — 3075F SYST BP GE 130 - 139MM HG: CPT | Performed by: NURSE PRACTITIONER

## 2024-04-22 PROCEDURE — 1159F MED LIST DOCD IN RCRD: CPT | Performed by: NURSE PRACTITIONER

## 2024-04-22 PROCEDURE — 3079F DIAST BP 80-89 MM HG: CPT | Performed by: NURSE PRACTITIONER

## 2024-04-22 PROCEDURE — 85610 PROTHROMBIN TIME: CPT | Performed by: NURSE PRACTITIONER

## 2024-04-22 ASSESSMENT — ENCOUNTER SYMPTOMS
CONSTITUTIONAL NEGATIVE: 1
PSYCHIATRIC NEGATIVE: 1
GASTROINTESTINAL NEGATIVE: 1
ENDOCRINE NEGATIVE: 1
CARDIOVASCULAR NEGATIVE: 1
MUSCULOSKELETAL NEGATIVE: 1
RESPIRATORY NEGATIVE: 1
NEUROLOGICAL NEGATIVE: 1

## 2024-04-22 NOTE — PROGRESS NOTES
" Anurag Cummings Jr. is a 78 y.o. here for a diabetic follow up exam.     Pt states they are doing well. Following a low carbohydrate diet and is active.     Up to date with eye and foot exams, pcp visits.     Wt today 226  (228)  Urine micro 9/23 normal  Last aic 6.5 (6.6 no meds)  - 6.1 today  Very active, bikes, etc   Utd with cards and ok there no s/s    Inr check today 2.4 (2.0)  - 1.5 today   Warfarin continues at 7.5mg daily  No bleeding  Eating more greens for the sugar/health  Will take 10mg x 1 today then back to 7.5mg all days   1 week pcp [place] visit - results to dr logan (i will be out of the office)   Rto 5 weeks to see me for the inr check       Visit Vitals  /80   Pulse 62   Temp 36.6 °C (97.8 °F)           Lab Results   Component Value Date    POCGLU 84 04/22/2024    POCGLU 84 01/24/2024    HGBA1C 6.1 04/22/2024    HGBA1C 6.5 01/24/2024    HGBA1C 6.6 (A) 06/29/2023     /80   Pulse 62   Temp 36.6 °C (97.8 °F)   Ht 1.778 m (5' 10\")   Wt 103 kg (226 lb)   BMI 32.43 kg/m²    Wt Readings from Last 5 Encounters:   04/22/24 103 kg (226 lb)   03/26/24 103 kg (228 lb)   02/23/24 103 kg (228 lb)   01/24/24 108 kg (238 lb)   12/21/23 107 kg (236 lb)          Review of Systems   Constitutional: Negative.    HENT: Negative.     Respiratory: Negative.     Cardiovascular: Negative.    Gastrointestinal: Negative.    Endocrine: Negative.    Genitourinary: Negative.    Musculoskeletal: Negative.    Skin: Negative.    Neurological: Negative.    Psychiatric/Behavioral: Negative.          Physical Exam  Vitals and nursing note reviewed.   Constitutional:       Appearance: Normal appearance.   HENT:      Head: Normocephalic.   Eyes:      Pupils: Pupils are equal, round, and reactive to light.   Cardiovascular:      Rate and Rhythm: Normal rate and regular rhythm.      Heart sounds: Normal heart sounds.   Pulmonary:      Effort: Pulmonary effort is normal.      Breath sounds: Normal breath sounds. "   Skin:     General: Skin is warm and dry.   Neurological:      General: No focal deficit present.      Mental Status: He is alert and oriented to person, place, and time. Mental status is at baseline.   Psychiatric:         Mood and Affect: Mood normal.         Behavior: Behavior normal.         Thought Content: Thought content normal.         Judgment: Judgment normal.        Problem List Items Addressed This Visit    None  Visit Diagnoses       Type 2 diabetes mellitus with hyperglycemia, without long-term current use of insulin (Multi)    -  Primary    Relevant Orders    POCT glycosylated hemoglobin (Hb A1C) manually resulted (Completed)    POCT fingerstick glucose manually resulted (Completed)    Albumin , Urine Random    POCT INR manually resulted (Completed)    Coagulation defect (Multi)        Relevant Orders    POCT INR manually resulted    POCT INR manually resulted (Completed)             Laura L Seaver, APRN-CNP

## 2024-04-30 ENCOUNTER — CLINICAL SUPPORT (OUTPATIENT)
Dept: PRIMARY CARE | Facility: CLINIC | Age: 79
End: 2024-04-30
Payer: MEDICARE

## 2024-04-30 DIAGNOSIS — Z79.01 LONG TERM (CURRENT) USE OF ANTICOAGULANTS: ICD-10-CM

## 2024-04-30 LAB
POC INR: 2.6
POC PROTHROMBIN TIME: NORMAL

## 2024-04-30 PROCEDURE — 85610 PROTHROMBIN TIME: CPT | Performed by: FAMILY MEDICINE

## 2024-05-24 ENCOUNTER — OFFICE VISIT (OUTPATIENT)
Dept: PRIMARY CARE | Facility: CLINIC | Age: 79
End: 2024-05-24
Payer: MEDICARE

## 2024-05-24 VITALS
BODY MASS INDEX: 32.35 KG/M2 | RESPIRATION RATE: 16 BRPM | WEIGHT: 226 LBS | SYSTOLIC BLOOD PRESSURE: 118 MMHG | HEIGHT: 70 IN | HEART RATE: 68 BPM | DIASTOLIC BLOOD PRESSURE: 70 MMHG

## 2024-05-24 DIAGNOSIS — Z86.718 HISTORY OF DVT (DEEP VEIN THROMBOSIS): ICD-10-CM

## 2024-05-24 DIAGNOSIS — Z79.01 ANTICOAGULANT LONG-TERM USE: ICD-10-CM

## 2024-05-24 DIAGNOSIS — D68.9 COAGULATION DEFECT (MULTI): Primary | ICD-10-CM

## 2024-05-24 DIAGNOSIS — Z86.711 HISTORY OF PULMONARY EMBOLUS (PE): ICD-10-CM

## 2024-05-24 DIAGNOSIS — I48.0 PAROXYSMAL ATRIAL FIBRILLATION (MULTI): ICD-10-CM

## 2024-05-24 DIAGNOSIS — D68.59 HYPERCOAGULABLE STATE (MULTI): ICD-10-CM

## 2024-05-24 LAB
POC INR: 3.3
POC PROTHROMBIN TIME: NORMAL

## 2024-05-24 PROCEDURE — 99213 OFFICE O/P EST LOW 20 MIN: CPT | Performed by: NURSE PRACTITIONER

## 2024-05-24 PROCEDURE — 3074F SYST BP LT 130 MM HG: CPT | Performed by: NURSE PRACTITIONER

## 2024-05-24 PROCEDURE — 85610 PROTHROMBIN TIME: CPT | Performed by: NURSE PRACTITIONER

## 2024-05-24 PROCEDURE — 1160F RVW MEDS BY RX/DR IN RCRD: CPT | Performed by: NURSE PRACTITIONER

## 2024-05-24 PROCEDURE — 1159F MED LIST DOCD IN RCRD: CPT | Performed by: NURSE PRACTITIONER

## 2024-05-24 PROCEDURE — 1036F TOBACCO NON-USER: CPT | Performed by: NURSE PRACTITIONER

## 2024-05-24 PROCEDURE — 3078F DIAST BP <80 MM HG: CPT | Performed by: NURSE PRACTITIONER

## 2024-05-24 ASSESSMENT — ENCOUNTER SYMPTOMS
GASTROINTESTINAL NEGATIVE: 1
RESPIRATORY NEGATIVE: 1
CONSTITUTIONAL NEGATIVE: 1
ENDOCRINE NEGATIVE: 1
MUSCULOSKELETAL NEGATIVE: 1
NEUROLOGICAL NEGATIVE: 1
CARDIOVASCULAR NEGATIVE: 1
PSYCHIATRIC NEGATIVE: 1

## 2024-05-24 NOTE — PROGRESS NOTES
Anurag Cummings Jr. male  78 y.o. for long term use of anticoagulation visit.    HPI     Anurag Cummings Jr. is a 78 y.o. here for an INR follow up exam.   Patient has had no chest pain, pressure, palpitations, shortness of breath, or bleeding.      Last inr 2.6 (1.5, 2.4, 2.0)  Warfarin dosing 7.5mg daily  3.3 today. Will hold x 1 day then return and he   Will follow up in 3 weeks      Review of Systems   Constitutional: Negative.    HENT: Negative.     Respiratory: Negative.     Cardiovascular: Negative.    Gastrointestinal: Negative.    Endocrine: Negative.    Genitourinary: Negative.    Musculoskeletal: Negative.    Skin: Negative.    Neurological: Negative.    Psychiatric/Behavioral: Negative.         Physical Exam  Vitals and nursing note reviewed.   Constitutional:       Appearance: Normal appearance.   HENT:      Head: Normocephalic.   Eyes:      Pupils: Pupils are equal, round, and reactive to light.   Cardiovascular:      Rate and Rhythm: Normal rate and regular rhythm.      Heart sounds: Normal heart sounds.   Pulmonary:      Effort: Pulmonary effort is normal.      Breath sounds: Normal breath sounds.   Skin:     General: Skin is warm and dry.   Neurological:      General: No focal deficit present.      Mental Status: He is alert and oriented to person, place, and time. Mental status is at baseline.   Psychiatric:         Mood and Affect: Mood normal.         Behavior: Behavior normal.         Thought Content: Thought content normal.         Judgment: Judgment normal.          Visit Vitals  /70   Pulse 68   Resp 16      Vitals:    05/24/24 1312   Weight: 103 kg (226 lb)        Anticoagulation Episode Summary       Current INR goal:     TTR:  --   Next INR check:     INR from last check:  3.30 (5/24/2024)   Weekly max warfarin dose:     Target end date:     INR check location:     Preferred lab:     Send INR reminders to:         Comments:               Anticoagulation Monitoring INR INR  Date Last Week Total Next Week Total Sun Mon Tue Wed Thu   5/24/2024 3.30 5/24/2024 0 mg 0 mg - - - - -   4/30/2024 2.60 4/30/2024 0 mg 0 mg - - - - -   4/22/2024 1.50 4/22/2024 0 mg 0 mg - - - - -   3/26/2024 2.40 3/26/2024 0 mg 0 mg - - - - -   2/23/2024 2.00 2/23/2024 0 mg 0 mg - - - - -   1/24/2024 3.60 1/24/2024 0 mg 0 mg - - - - -   12/21/2023 3.20 12/21/2023 0 mg 0 mg - - - - -   11/21/2023 3.10 11/21/2023 0 mg 0 mg - - - - -   10/31/2023 3.00 10/31/2023 0 mg 0 mg - - - - -     Anticoagulation Monitoring Fri Sat Visit Report   5/24/2024 - - Report   4/30/2024 - - -   4/22/2024 - - Report   3/26/2024 - - Report   2/23/2024 - - Report   1/24/2024 - - Report   12/21/2023 - - Report   11/21/2023 - - Report   10/31/2023 - - Report      Problem List Items Addressed This Visit             ICD-10-CM    History of DVT (deep vein thrombosis) Z86.718    Relevant Orders    POCT INR manually resulted (Completed)    History of pulmonary embolus (PE) Z86.711    Relevant Orders    POCT INR manually resulted (Completed)     Other Visit Diagnoses         Codes    Coagulation defect (Multi)    -  Primary D68.9    Relevant Orders    POCT INR manually resulted (Completed)    Hypercoagulable state (Multi)     D68.59    Relevant Orders    POCT INR manually resulted (Completed)    Anticoagulant long-term use     Z79.01    Relevant Orders    POCT INR manually resulted (Completed)    Paroxysmal atrial fibrillation (Multi)     I48.0    Relevant Orders    POCT INR manually resulted (Completed)              Laura L Seaver, APRN-CNP

## 2024-06-04 ENCOUNTER — APPOINTMENT (OUTPATIENT)
Dept: PRIMARY CARE | Facility: CLINIC | Age: 79
End: 2024-06-04
Payer: COMMERCIAL

## 2024-06-05 ENCOUNTER — OFFICE VISIT (OUTPATIENT)
Dept: PRIMARY CARE | Facility: CLINIC | Age: 79
End: 2024-06-05
Payer: MEDICARE

## 2024-06-05 VITALS
HEIGHT: 70 IN | RESPIRATION RATE: 18 BRPM | SYSTOLIC BLOOD PRESSURE: 138 MMHG | BODY MASS INDEX: 32.07 KG/M2 | DIASTOLIC BLOOD PRESSURE: 72 MMHG | OXYGEN SATURATION: 99 % | HEART RATE: 80 BPM | WEIGHT: 224 LBS | TEMPERATURE: 97.4 F

## 2024-06-05 DIAGNOSIS — I10 HYPERTENSION, UNSPECIFIED TYPE: ICD-10-CM

## 2024-06-05 DIAGNOSIS — N40.0 BENIGN PROSTATIC HYPERPLASIA WITHOUT URINARY OBSTRUCTION: ICD-10-CM

## 2024-06-05 DIAGNOSIS — Z00.00 WELL ADULT EXAM: Primary | ICD-10-CM

## 2024-06-05 DIAGNOSIS — E78.2 MIXED HYPERLIPIDEMIA: ICD-10-CM

## 2024-06-05 DIAGNOSIS — Z91.030 ALLERGY TO HONEY BEE VENOM: ICD-10-CM

## 2024-06-05 PROCEDURE — 1160F RVW MEDS BY RX/DR IN RCRD: CPT | Performed by: FAMILY MEDICINE

## 2024-06-05 PROCEDURE — 1123F ACP DISCUSS/DSCN MKR DOCD: CPT | Performed by: FAMILY MEDICINE

## 2024-06-05 PROCEDURE — 3075F SYST BP GE 130 - 139MM HG: CPT | Performed by: FAMILY MEDICINE

## 2024-06-05 PROCEDURE — G0439 PPPS, SUBSEQ VISIT: HCPCS | Performed by: FAMILY MEDICINE

## 2024-06-05 PROCEDURE — 1170F FXNL STATUS ASSESSED: CPT | Performed by: FAMILY MEDICINE

## 2024-06-05 PROCEDURE — 3078F DIAST BP <80 MM HG: CPT | Performed by: FAMILY MEDICINE

## 2024-06-05 PROCEDURE — 1036F TOBACCO NON-USER: CPT | Performed by: FAMILY MEDICINE

## 2024-06-05 PROCEDURE — 1159F MED LIST DOCD IN RCRD: CPT | Performed by: FAMILY MEDICINE

## 2024-06-05 RX ORDER — EPINEPHRINE 0.3 MG/.3ML
1 INJECTION INTRAMUSCULAR ONCE AS NEEDED
Qty: 1 EACH | Refills: 0 | Status: SHIPPED | OUTPATIENT
Start: 2024-06-05

## 2024-06-05 ASSESSMENT — ACTIVITIES OF DAILY LIVING (ADL)
TAKING_MEDICATION: INDEPENDENT
GROCERY_SHOPPING: INDEPENDENT
DOING_HOUSEWORK: INDEPENDENT
BATHING: INDEPENDENT
MANAGING_FINANCES: INDEPENDENT
DRESSING: INDEPENDENT

## 2024-06-05 ASSESSMENT — PATIENT HEALTH QUESTIONNAIRE - PHQ9
2. FEELING DOWN, DEPRESSED OR HOPELESS: NOT AT ALL
SUM OF ALL RESPONSES TO PHQ9 QUESTIONS 1 AND 2: 0
1. LITTLE INTEREST OR PLEASURE IN DOING THINGS: NOT AT ALL

## 2024-06-05 NOTE — PROGRESS NOTES
"Subjective   Reason for Visit: Anurag Cummings Jr. is an 78 y.o. male here for a Medicare Wellness visit.          Reviewed all medications by prescribing practitioner or clinical pharmacist (such as prescriptions, OTCs, herbal therapies and supplements) and documented in the medical record.    HPI  Exercises on a regular basis  Patient Care Team:  Kimi Lee MD as PCP - General  Laura L Seaver, APRN-CNP as PCP - [facility] ACO Attributed Provider     Review of Systems   All other systems reviewed and are negative.      Objective   Vitals:  /72 (BP Location: Left arm, Patient Position: Sitting, BP Cuff Size: Adult)   Pulse 80   Temp 36.3 °C (97.4 °F) (Temporal)   Resp 18   Ht 1.778 m (5' 10\")   Wt 102 kg (224 lb)   SpO2 99%   BMI 32.14 kg/m²       Physical Exam  Constitutional:       Appearance: Normal appearance.   HENT:      Head: Normocephalic.      Right Ear: Tympanic membrane normal.      Nose: Nose normal.      Mouth/Throat:      Mouth: Mucous membranes are moist.   Eyes:      Pupils: Pupils are equal, round, and reactive to light.   Cardiovascular:      Rate and Rhythm: Normal rate and regular rhythm.      Pulses: Normal pulses.   Pulmonary:      Effort: Pulmonary effort is normal.   Abdominal:      General: Abdomen is flat.   Musculoskeletal:         General: Normal range of motion.      Cervical back: Normal range of motion.   Skin:     General: Skin is warm.   Neurological:      Mental Status: He is alert.   Psychiatric:         Mood and Affect: Mood normal.         Assessment/Plan   Problem List Items Addressed This Visit    None    Problem List Items Addressed This Visit       Benign prostatic hyperplasia without urinary obstruction    Relevant Orders    PSA, total and free    HTN (hypertension)    Relevant Orders    Urinalysis with Reflex Microscopic    Lipid Panel    CBC and Auto Differential    Thyroid Stimulating Hormone    Comprehensive Metabolic Panel    PSA, total and free    " Hyperlipidemia    Relevant Orders    Urinalysis with Reflex Microscopic    Lipid Panel    CBC and Auto Differential    Thyroid Stimulating Hormone    Comprehensive Metabolic Panel    PSA, total and free     Other Visit Diagnoses       Well adult exam    -  Primary    Allergy to honey bee venom        Relevant Medications    EPINEPHrine (EpiPen 2-Reid) 0.3 mg/0.3 mL injection syringe

## 2024-06-06 ENCOUNTER — LAB (OUTPATIENT)
Dept: LAB | Facility: LAB | Age: 79
End: 2024-06-06
Payer: MEDICARE

## 2024-06-06 DIAGNOSIS — N40.0 BENIGN PROSTATIC HYPERPLASIA WITHOUT URINARY OBSTRUCTION: ICD-10-CM

## 2024-06-06 DIAGNOSIS — E11.65 TYPE 2 DIABETES MELLITUS WITH HYPERGLYCEMIA, WITHOUT LONG-TERM CURRENT USE OF INSULIN (MULTI): ICD-10-CM

## 2024-06-06 DIAGNOSIS — I10 HYPERTENSION, UNSPECIFIED TYPE: ICD-10-CM

## 2024-06-06 DIAGNOSIS — E78.2 MIXED HYPERLIPIDEMIA: ICD-10-CM

## 2024-06-06 LAB
ALBUMIN SERPL BCP-MCNC: 4 G/DL (ref 3.4–5)
ALP SERPL-CCNC: 42 U/L (ref 33–136)
ALT SERPL W P-5'-P-CCNC: 25 U/L (ref 10–52)
ANION GAP SERPL CALC-SCNC: 10 MMOL/L (ref 10–20)
APPEARANCE UR: CLEAR
AST SERPL W P-5'-P-CCNC: 25 U/L (ref 9–39)
BASOPHILS # BLD AUTO: 0.08 X10*3/UL (ref 0–0.1)
BASOPHILS NFR BLD AUTO: 1 %
BILIRUB SERPL-MCNC: 0.7 MG/DL (ref 0–1.2)
BILIRUB UR STRIP.AUTO-MCNC: NEGATIVE MG/DL
BUN SERPL-MCNC: 17 MG/DL (ref 6–23)
CALCIUM SERPL-MCNC: 9.3 MG/DL (ref 8.6–10.3)
CHLORIDE SERPL-SCNC: 104 MMOL/L (ref 98–107)
CHOLEST SERPL-MCNC: 156 MG/DL (ref 0–199)
CHOLESTEROL/HDL RATIO: 3.8
CO2 SERPL-SCNC: 28 MMOL/L (ref 21–32)
COLOR UR: COLORLESS
CREAT SERPL-MCNC: 0.97 MG/DL (ref 0.5–1.3)
CREAT UR-MCNC: 41.1 MG/DL (ref 20–370)
EGFRCR SERPLBLD CKD-EPI 2021: 81 ML/MIN/1.73M*2
EOSINOPHIL # BLD AUTO: 0.14 X10*3/UL (ref 0–0.4)
EOSINOPHIL NFR BLD AUTO: 1.8 %
ERYTHROCYTE [DISTWIDTH] IN BLOOD BY AUTOMATED COUNT: 14.5 % (ref 11.5–14.5)
GLUCOSE SERPL-MCNC: 95 MG/DL (ref 74–99)
GLUCOSE UR STRIP.AUTO-MCNC: NORMAL MG/DL
HCT VFR BLD AUTO: 46.7 % (ref 41–52)
HDLC SERPL-MCNC: 40.6 MG/DL
HGB BLD-MCNC: 15 G/DL (ref 13.5–17.5)
IMM GRANULOCYTES # BLD AUTO: 0.02 X10*3/UL (ref 0–0.5)
IMM GRANULOCYTES NFR BLD AUTO: 0.3 % (ref 0–0.9)
KETONES UR STRIP.AUTO-MCNC: NEGATIVE MG/DL
LDLC SERPL CALC-MCNC: 95 MG/DL
LEUKOCYTE ESTERASE UR QL STRIP.AUTO: NEGATIVE
LYMPHOCYTES # BLD AUTO: 2.5 X10*3/UL (ref 0.8–3)
LYMPHOCYTES NFR BLD AUTO: 32.3 %
MCH RBC QN AUTO: 29.2 PG (ref 26–34)
MCHC RBC AUTO-ENTMCNC: 32.1 G/DL (ref 32–36)
MCV RBC AUTO: 91 FL (ref 80–100)
MICROALBUMIN UR-MCNC: <7 MG/L
MICROALBUMIN/CREAT UR: NORMAL MG/G{CREAT}
MONOCYTES # BLD AUTO: 0.57 X10*3/UL (ref 0.05–0.8)
MONOCYTES NFR BLD AUTO: 7.4 %
NEUTROPHILS # BLD AUTO: 4.44 X10*3/UL (ref 1.6–5.5)
NEUTROPHILS NFR BLD AUTO: 57.2 %
NITRITE UR QL STRIP.AUTO: NEGATIVE
NON HDL CHOLESTEROL: 115 MG/DL (ref 0–149)
NRBC BLD-RTO: 0 /100 WBCS (ref 0–0)
PH UR STRIP.AUTO: 7 [PH]
PLATELET # BLD AUTO: 167 X10*3/UL (ref 150–450)
POTASSIUM SERPL-SCNC: 4.3 MMOL/L (ref 3.5–5.3)
PROT SERPL-MCNC: 6.5 G/DL (ref 6.4–8.2)
PROT UR STRIP.AUTO-MCNC: NEGATIVE MG/DL
RBC # BLD AUTO: 5.13 X10*6/UL (ref 4.5–5.9)
RBC # UR STRIP.AUTO: NEGATIVE /UL
SODIUM SERPL-SCNC: 138 MMOL/L (ref 136–145)
SP GR UR STRIP.AUTO: 1.01
TRIGL SERPL-MCNC: 104 MG/DL (ref 0–149)
TSH SERPL-ACNC: 1.57 MIU/L (ref 0.44–3.98)
UROBILINOGEN UR STRIP.AUTO-MCNC: NORMAL MG/DL
VLDL: 21 MG/DL (ref 0–40)
WBC # BLD AUTO: 7.8 X10*3/UL (ref 4.4–11.3)

## 2024-06-06 PROCEDURE — 84443 ASSAY THYROID STIM HORMONE: CPT

## 2024-06-06 PROCEDURE — 84154 ASSAY OF PSA FREE: CPT

## 2024-06-06 PROCEDURE — 80061 LIPID PANEL: CPT

## 2024-06-06 PROCEDURE — 81003 URINALYSIS AUTO W/O SCOPE: CPT

## 2024-06-06 PROCEDURE — 85025 COMPLETE CBC W/AUTO DIFF WBC: CPT

## 2024-06-06 PROCEDURE — 80053 COMPREHEN METABOLIC PANEL: CPT

## 2024-06-06 PROCEDURE — 82043 UR ALBUMIN QUANTITATIVE: CPT

## 2024-06-06 PROCEDURE — 82570 ASSAY OF URINE CREATININE: CPT

## 2024-06-06 PROCEDURE — 84153 ASSAY OF PSA TOTAL: CPT

## 2024-06-06 PROCEDURE — 36415 COLL VENOUS BLD VENIPUNCTURE: CPT

## 2024-06-08 LAB
PSA FREE MFR SERPL: 33 %
PSA FREE SERPL-MCNC: 0.1 NG/ML
PSA SERPL IA-MCNC: 0.3 NG/ML (ref 0–4)

## 2024-06-20 ENCOUNTER — APPOINTMENT (OUTPATIENT)
Dept: PRIMARY CARE | Facility: CLINIC | Age: 79
End: 2024-06-20
Payer: MEDICARE

## 2024-06-20 VITALS
RESPIRATION RATE: 16 BRPM | DIASTOLIC BLOOD PRESSURE: 75 MMHG | WEIGHT: 224 LBS | HEART RATE: 66 BPM | BODY MASS INDEX: 32.14 KG/M2 | TEMPERATURE: 97.4 F | SYSTOLIC BLOOD PRESSURE: 122 MMHG

## 2024-06-20 DIAGNOSIS — Z86.711 HISTORY OF PULMONARY EMBOLUS (PE): ICD-10-CM

## 2024-06-20 DIAGNOSIS — Z79.01 ANTICOAGULANT LONG-TERM USE: ICD-10-CM

## 2024-06-20 DIAGNOSIS — I48.0 PAROXYSMAL ATRIAL FIBRILLATION (MULTI): ICD-10-CM

## 2024-06-20 DIAGNOSIS — D68.59 HYPERCOAGULABLE STATE (MULTI): Primary | ICD-10-CM

## 2024-06-20 DIAGNOSIS — Z86.718 HISTORY OF DVT (DEEP VEIN THROMBOSIS): ICD-10-CM

## 2024-06-20 DIAGNOSIS — I10 HYPERTENSION, UNSPECIFIED TYPE: ICD-10-CM

## 2024-06-20 DIAGNOSIS — R91.8 ABNORMAL CHEST X-RAY WITH MULTIPLE NODULES: ICD-10-CM

## 2024-06-20 DIAGNOSIS — R93.89 ABNORMAL CT OF THE CHEST: ICD-10-CM

## 2024-06-20 DIAGNOSIS — R91.8 PULMONARY NODULES/LESIONS, MULTIPLE: ICD-10-CM

## 2024-06-20 LAB
POC INR: 2
POC PROTHROMBIN TIME: NORMAL

## 2024-06-20 PROCEDURE — 85610 PROTHROMBIN TIME: CPT | Performed by: NURSE PRACTITIONER

## 2024-06-20 PROCEDURE — 1123F ACP DISCUSS/DSCN MKR DOCD: CPT | Performed by: NURSE PRACTITIONER

## 2024-06-20 PROCEDURE — 1036F TOBACCO NON-USER: CPT | Performed by: NURSE PRACTITIONER

## 2024-06-20 PROCEDURE — 3078F DIAST BP <80 MM HG: CPT | Performed by: NURSE PRACTITIONER

## 2024-06-20 PROCEDURE — 1159F MED LIST DOCD IN RCRD: CPT | Performed by: NURSE PRACTITIONER

## 2024-06-20 PROCEDURE — 1160F RVW MEDS BY RX/DR IN RCRD: CPT | Performed by: NURSE PRACTITIONER

## 2024-06-20 PROCEDURE — 99213 OFFICE O/P EST LOW 20 MIN: CPT | Performed by: NURSE PRACTITIONER

## 2024-06-20 PROCEDURE — 3074F SYST BP LT 130 MM HG: CPT | Performed by: NURSE PRACTITIONER

## 2024-06-20 RX ORDER — METOPROLOL SUCCINATE 25 MG/1
TABLET, EXTENDED RELEASE ORAL
COMMUNITY
Start: 2024-04-29

## 2024-06-20 ASSESSMENT — ENCOUNTER SYMPTOMS
MUSCULOSKELETAL NEGATIVE: 1
PSYCHIATRIC NEGATIVE: 1
CONSTITUTIONAL NEGATIVE: 1
GASTROINTESTINAL NEGATIVE: 1
ENDOCRINE NEGATIVE: 1
CARDIOVASCULAR NEGATIVE: 1
NEUROLOGICAL NEGATIVE: 1
RESPIRATORY NEGATIVE: 1

## 2024-06-20 NOTE — PROGRESS NOTES
Anurag Cummings Jr. male  78 y.o. for long term use of anticoagulation visit.    HPI     Anurag Cummings Jr. is a 78 y.o. here for an INR follow up exam.   Patient has had no chest pain, pressure, palpitations, shortness of breath, or bleeding.    Last aic 6.1 4/24 will be due next month  Also just saw dr logan pcp and reviewed labs all look good    Last inr up a bit at 3.3 - held x 1 day then back to his normal regime of   Warfarin dosing 7.5mg all days     He has watched the diet and inr today is 2.0  Follow up one month    Hx of nodules. See notes. Pcp said addtional image/ct then no more needed. Ordered. He has no s/s.         Review of Systems   Constitutional: Negative.    HENT: Negative.     Respiratory: Negative.     Cardiovascular: Negative.    Gastrointestinal: Negative.    Endocrine: Negative.    Genitourinary: Negative.    Musculoskeletal: Negative.    Skin: Negative.    Neurological: Negative.    Psychiatric/Behavioral: Negative.         Physical Exam  Vitals and nursing note reviewed.   Constitutional:       Appearance: Normal appearance.   HENT:      Head: Normocephalic.   Eyes:      Pupils: Pupils are equal, round, and reactive to light.   Cardiovascular:      Rate and Rhythm: Normal rate and regular rhythm.      Heart sounds: Normal heart sounds.   Pulmonary:      Effort: Pulmonary effort is normal.      Breath sounds: Normal breath sounds.   Skin:     General: Skin is warm and dry.   Neurological:      General: No focal deficit present.      Mental Status: He is alert and oriented to person, place, and time. Mental status is at baseline.   Psychiatric:         Mood and Affect: Mood normal.         Behavior: Behavior normal.         Thought Content: Thought content normal.         Judgment: Judgment normal.        Visit Vitals  /75   Pulse 66   Temp 36.3 °C (97.4 °F) (Temporal)   Resp 16      Vitals:    06/20/24 1347   Weight: 102 kg (224 lb)        Anticoagulation Episode Summary        Current INR goal:     TTR:  --   Next INR check:     INR from last check:  2.00 (6/20/2024)   Weekly max warfarin dose:     Target end date:     INR check location:     Preferred lab:     Send INR reminders to:         Comments:               Anticoagulation Monitoring INR INR Date Last Week Total Next Week Total Sun Mon Tue Wed Thu   6/20/2024 2.00 6/20/2024 0 mg 0 mg - - - - -   5/24/2024 3.30 5/24/2024 0 mg 0 mg - - - - -   4/30/2024 2.60 4/30/2024 0 mg 0 mg - - - - -   4/22/2024 1.50 4/22/2024 0 mg 0 mg - - - - -   3/26/2024 2.40 3/26/2024 0 mg 0 mg - - - - -   2/23/2024 2.00 2/23/2024 0 mg 0 mg - - - - -   1/24/2024 3.60 1/24/2024 0 mg 0 mg - - - - -   12/21/2023 3.20 12/21/2023 0 mg 0 mg - - - - -   11/21/2023 3.10 11/21/2023 0 mg 0 mg - - - - -   10/31/2023 3.00 10/31/2023 0 mg 0 mg - - - - -     Anticoagulation Monitoring Fri Sat Visit Report   6/20/2024 - - Report   5/24/2024 - - Report   4/30/2024 - - -   4/22/2024 - - Report   3/26/2024 - - Report   2/23/2024 - - Report   1/24/2024 - - Report   12/21/2023 - - Report   11/21/2023 - - Report   10/31/2023 - - Report      Problem List Items Addressed This Visit       History of DVT (deep vein thrombosis)    Relevant Orders    POCT INR manually resulted (Completed)    History of pulmonary embolus (PE)    Relevant Orders    POCT INR manually resulted (Completed)    HTN (hypertension)    Relevant Orders    POCT INR manually resulted (Completed)     Other Visit Diagnoses       Hypercoagulable state (Multi)    -  Primary    Relevant Orders    POCT INR manually resulted (Completed)    Anticoagulant long-term use        Relevant Orders    POCT INR manually resulted (Completed)    Paroxysmal atrial fibrillation (Multi)        Relevant Medications    metoprolol succinate XL (Toprol-XL) 25 mg 24 hr tablet    Other Relevant Orders    POCT INR manually resulted (Completed)               Laura L Seaver, APRN-CNP

## 2024-07-17 DIAGNOSIS — I15.9 SECONDARY HYPERTENSION: Primary | ICD-10-CM

## 2024-07-17 RX ORDER — LOSARTAN POTASSIUM 25 MG/1
TABLET ORAL
Qty: 180 TABLET | Refills: 3 | Status: SHIPPED | OUTPATIENT
Start: 2024-07-17

## 2024-07-18 ENCOUNTER — APPOINTMENT (OUTPATIENT)
Dept: PRIMARY CARE | Facility: CLINIC | Age: 79
End: 2024-07-18
Payer: COMMERCIAL

## 2024-07-18 VITALS
BODY MASS INDEX: 31.64 KG/M2 | WEIGHT: 221 LBS | HEIGHT: 70 IN | SYSTOLIC BLOOD PRESSURE: 132 MMHG | HEART RATE: 71 BPM | DIASTOLIC BLOOD PRESSURE: 74 MMHG | RESPIRATION RATE: 16 BRPM

## 2024-07-18 DIAGNOSIS — Z79.01 ANTICOAGULANT LONG-TERM USE: ICD-10-CM

## 2024-07-18 DIAGNOSIS — Z86.711 HISTORY OF PULMONARY EMBOLUS (PE): ICD-10-CM

## 2024-07-18 DIAGNOSIS — I48.0 PAROXYSMAL ATRIAL FIBRILLATION (MULTI): ICD-10-CM

## 2024-07-18 DIAGNOSIS — D68.59 HYPERCOAGULABLE STATE (MULTI): Primary | ICD-10-CM

## 2024-07-18 DIAGNOSIS — I10 HYPERTENSION, UNSPECIFIED TYPE: ICD-10-CM

## 2024-07-18 DIAGNOSIS — Z86.718 HISTORY OF DVT (DEEP VEIN THROMBOSIS): ICD-10-CM

## 2024-07-18 LAB
POC INR: 1.7
POC PROTHROMBIN TIME: NORMAL

## 2024-07-18 PROCEDURE — 3078F DIAST BP <80 MM HG: CPT | Performed by: NURSE PRACTITIONER

## 2024-07-18 PROCEDURE — 99213 OFFICE O/P EST LOW 20 MIN: CPT | Performed by: NURSE PRACTITIONER

## 2024-07-18 PROCEDURE — 1159F MED LIST DOCD IN RCRD: CPT | Performed by: NURSE PRACTITIONER

## 2024-07-18 PROCEDURE — 85610 PROTHROMBIN TIME: CPT | Performed by: NURSE PRACTITIONER

## 2024-07-18 PROCEDURE — 1123F ACP DISCUSS/DSCN MKR DOCD: CPT | Performed by: NURSE PRACTITIONER

## 2024-07-18 PROCEDURE — 3075F SYST BP GE 130 - 139MM HG: CPT | Performed by: NURSE PRACTITIONER

## 2024-07-18 ASSESSMENT — ENCOUNTER SYMPTOMS
NEUROLOGICAL NEGATIVE: 1
GASTROINTESTINAL NEGATIVE: 1
ENDOCRINE NEGATIVE: 1
CARDIOVASCULAR NEGATIVE: 1
RESPIRATORY NEGATIVE: 1
CONSTITUTIONAL NEGATIVE: 1
PSYCHIATRIC NEGATIVE: 1
MUSCULOSKELETAL NEGATIVE: 1

## 2024-07-18 NOTE — PROGRESS NOTES
" Anurag Cummings Jr. is a 78 y.o. here inr follow up exam.     Up to date with eye and foot exams, pcp visits.     Feeling well  Dr reyes cards no s/s  Some ankle pain left but twisted -already has podiatry appt this week later he says he is ok till then/raise it    Inr 1.7  Warfarin 7.5mg today plus take 10mg x 1 today   Then back to 7.5mg all days   Denies cp, sob, pressure, pain or swelling     Lab Results   Component Value Date    POCGLU 84 04/22/2024    POCGLU 84 01/24/2024    HGBA1C 6.1 04/22/2024    HGBA1C 6.5 01/24/2024    HGBA1C 6.6 (A) 06/29/2023     /74   Pulse 71   Resp 16   Ht 1.788 m (5' 10.39\")   Wt 100 kg (221 lb)   BMI 31.36 kg/m²    Wt Readings from Last 5 Encounters:   07/18/24 100 kg (221 lb)   06/20/24 102 kg (224 lb)   06/05/24 102 kg (224 lb)   05/24/24 103 kg (226 lb)   04/22/24 103 kg (226 lb)          Review of Systems   Constitutional: Negative.    HENT: Negative.     Respiratory: Negative.     Cardiovascular: Negative.    Gastrointestinal: Negative.    Endocrine: Negative.    Genitourinary: Negative.    Musculoskeletal: Negative.    Skin: Negative.    Neurological: Negative.    Psychiatric/Behavioral: Negative.          Physical Exam  Vitals and nursing note reviewed.   Constitutional:       Appearance: Normal appearance.   HENT:      Head: Normocephalic.   Eyes:      Pupils: Pupils are equal, round, and reactive to light.   Cardiovascular:      Rate and Rhythm: Normal rate and regular rhythm.      Heart sounds: Normal heart sounds.   Pulmonary:      Effort: Pulmonary effort is normal.      Breath sounds: Normal breath sounds.   Skin:     General: Skin is warm and dry.   Neurological:      General: No focal deficit present.      Mental Status: He is alert and oriented to person, place, and time. Mental status is at baseline.   Psychiatric:         Mood and Affect: Mood normal.         Behavior: Behavior normal.         Thought Content: Thought content normal.         " Judgment: Judgment normal.        Problem List Items Addressed This Visit             ICD-10-CM    History of DVT (deep vein thrombosis) Z86.718    History of pulmonary embolus (PE) Z86.711    HTN (hypertension) I10     Other Visit Diagnoses         Codes    Hypercoagulable state (Multi)    -  Primary D68.59    Anticoagulant long-term use     Z79.01    Paroxysmal atrial fibrillation (Multi)     I48.0           Laura L Seaver, APRN-CNP

## 2024-07-25 ENCOUNTER — APPOINTMENT (OUTPATIENT)
Dept: PODIATRY | Facility: CLINIC | Age: 79
End: 2024-07-25
Payer: MEDICARE

## 2024-07-25 DIAGNOSIS — M79.672 LEFT FOOT PAIN: ICD-10-CM

## 2024-07-25 DIAGNOSIS — M79.671 RIGHT FOOT PAIN: ICD-10-CM

## 2024-07-25 DIAGNOSIS — M21.41 PES PLANUS OF BOTH FEET: ICD-10-CM

## 2024-07-25 DIAGNOSIS — M72.2 PLANTAR FASCIITIS: Primary | ICD-10-CM

## 2024-07-25 DIAGNOSIS — M21.42 PES PLANUS OF BOTH FEET: ICD-10-CM

## 2024-07-25 PROCEDURE — 1123F ACP DISCUSS/DSCN MKR DOCD: CPT | Performed by: PODIATRIST

## 2024-07-25 PROCEDURE — 99212 OFFICE O/P EST SF 10 MIN: CPT | Performed by: PODIATRIST

## 2024-07-25 PROCEDURE — 1159F MED LIST DOCD IN RCRD: CPT | Performed by: PODIATRIST

## 2024-07-25 PROCEDURE — 1160F RVW MEDS BY RX/DR IN RCRD: CPT | Performed by: PODIATRIST

## 2024-07-25 PROCEDURE — 1036F TOBACCO NON-USER: CPT | Performed by: PODIATRIST

## 2024-07-26 NOTE — PROGRESS NOTES
CC: heel pain.     HPI:  Patient presents complaining  of heel pain since 1984, had some old orthotics. Pain is moderate.  Pain is present with initial step and after sitting, denies trauma.   Denies any swelling or redness.     PCP: Dr. Lee  Last visit: 6-5-24     PMH  History reviewed. No pertinent past medical history.  MEDS    Current Outpatient Medications:     aspirin 81 mg EC tablet, Take 1 tablet (81 mg) by mouth once daily., Disp: , Rfl:     atorvastatin (Lipitor) 20 mg tablet, One tablet daily for cholest, Disp: 90 tablet, Rfl: 3    cholecalciferol (Vitamin D-3) 25 MCG (1000 UT) tablet, Take 1 tablet (1,000 Units) by mouth once daily., Disp: , Rfl:     EPINEPHrine (EpiPen 2-Reid) 0.3 mg/0.3 mL injection syringe, Inject 0.3 mL (0.3 mg) into the muscle 1 time if needed for anaphylaxis for up to 1 dose. As directed, Disp: 1 each, Rfl: 0    fluticasone (Flonase) 50 mcg/actuation nasal spray, Flonase, Disp: , Rfl:     furosemide (Lasix) 20 mg tablet, Take one tablet on all Mon/Wed/friday, Disp: 90 tablet, Rfl: 3    ketoconazole (NIZOral) 2 % shampoo,  , Disp: , Rfl:     loratadine (Claritin) 10 mg tablet, Take 1 tablet (10 mg) by mouth once daily., Disp: , Rfl:     losartan (Cozaar) 25 mg tablet, TAKE 1 TABLET IN THE MORNING AND 1 TABLET IN THE EVENING, Disp: 180 tablet, Rfl: 3    metoprolol succinate XL (Toprol-XL) 25 mg 24 hr tablet, , Disp: , Rfl:     naloxone (Narcan) 4 mg/0.1 mL nasal spray, take 0.1 ml Nasally call 911 immediately, when breathing is severely diminished. may repeat in 3 minutes if necessary for 30 days, Disp: , Rfl:     pregabalin (Lyrica) 75 mg capsule, TAKE 1 CAPSULE BY MOUTH ONCE DAILY AT BEDTIME, Disp: , Rfl:     tapentadol ER (Nucynta ER) 50 mg 12 hr tablet, Take 1 tablet (50 mg) by mouth once daily., Disp: , Rfl:     tiZANidine (Zanaflex) 4 mg tablet, Take 1 tablet (4 mg) by mouth as needed at bedtime., Disp: , Rfl:     warfarin (Coumadin) 5 mg tablet, TAKE 1 TABLET DAILY IN THE  EVENING, Disp: 90 tablet, Rfl: 3  Allergies  Allergies   Allergen Reactions    Bee Venom Protein (Honey Bee) Unknown     Social History     Socioeconomic History    Marital status:    Tobacco Use    Smoking status: Former     Current packs/day: 0.50     Types: Cigarettes    Smokeless tobacco: Never   Vaping Use    Vaping status: Never Used   Substance and Sexual Activity    Alcohol use: Yes     Comment: holidays mostly    Drug use: Never    Sexual activity: Yes     Family History   Problem Relation Name Age of Onset    Factor V Leiden deficiency Mother      Factor V Leiden deficiency Father      Pancreatic cancer Father       Past Surgical History:   Procedure Laterality Date    OTHER SURGICAL HISTORY  10/01/2020    Umbilical hernia repair    OTHER SURGICAL HISTORY  10/01/2020    Eye surgery    OTHER SURGICAL HISTORY  10/01/2020    [place] filter placement    OTHER SURGICAL HISTORY  10/01/2020    Tonsillectomy    OTHER SURGICAL HISTORY  10/01/2020    Cataract surgery    OTHER SURGICAL HISTORY  11/24/2020    Knee surgery       REVIEW OF SYSTEMS    Musculoskeletal: + as noted in HPI.       Physical examination:   On General Observation: Patient is a pleasant, cooperative, well developed 78 y.o.  adult male. The patient is alert and oriented to time, place and person. Patient has normal affect and mood.  There were no vitals taken for this visit.    Vascular:  DP and PT pulses are 2/4 b/l.  Mild edema to noted to b/l heel.      Muscular: Strength is 5/5 for all instrinsic and extrinsic muscle groups with exception of ankle dorsiflexion and plantarflexion d/t guarding.  Pain with palpation to the plantar medial aspect of the b/l heel at the insertion of the plantar medial calcaneal tubercle.  No signs of calcaneal stress fracture.      Neuro:   Light touch present bilateral.     Derm:   Skin texture and turgor within normal limits.     No rashes, subcutaneous nodules, or open lesions noted.  No hyperkeratotic  tissue. No erythema    Ortho:  Severe flat foot non reducible is present, pain is present medial band of the plantar fascia, decreased rom of the 1st MPJ, MTJ, STJ, AJ.    ASSESSMENT:    Plantar fasciitis b/l le  Pain feet  Pes planus       PLAN:   Consult  A comprehensive history and physical examination were preformed. The patient was educated on clinical findings, diagnosis and treatment plans. Patient understands all that has been explained and all questions were answered to apparent satisfaction.     - We discussed the etiology of the heel complaints as well as the plantar fasciitis treatment protocol.  - Pt to to begin stretching, icing,  and wearing walking boot/appropriate shoes .   -Advised patient on use of anti-inflammatory medications .   - Patient given instructions to start PT. RX given   - Casted for custom orthotics biofoam ABN signed  - Hand out given        Shimon Quintana DPM  \

## 2024-08-01 ENCOUNTER — APPOINTMENT (OUTPATIENT)
Dept: PRIMARY CARE | Facility: CLINIC | Age: 79
End: 2024-08-01
Payer: MEDICARE

## 2024-08-01 VITALS
RESPIRATION RATE: 16 BRPM | BODY MASS INDEX: 31.78 KG/M2 | HEART RATE: 64 BPM | TEMPERATURE: 97.8 F | WEIGHT: 224 LBS | DIASTOLIC BLOOD PRESSURE: 69 MMHG | SYSTOLIC BLOOD PRESSURE: 114 MMHG

## 2024-08-01 DIAGNOSIS — Z86.718 HISTORY OF DVT (DEEP VEIN THROMBOSIS): ICD-10-CM

## 2024-08-01 DIAGNOSIS — I10 HYPERTENSION, UNSPECIFIED TYPE: ICD-10-CM

## 2024-08-01 DIAGNOSIS — E11.65 TYPE 2 DIABETES MELLITUS WITH HYPERGLYCEMIA, WITHOUT LONG-TERM CURRENT USE OF INSULIN (MULTI): Primary | ICD-10-CM

## 2024-08-01 DIAGNOSIS — Z79.01 ANTICOAGULANT LONG-TERM USE: ICD-10-CM

## 2024-08-01 DIAGNOSIS — D68.59 HYPERCOAGULABLE STATE (MULTI): ICD-10-CM

## 2024-08-01 DIAGNOSIS — I48.0 PAROXYSMAL ATRIAL FIBRILLATION (MULTI): ICD-10-CM

## 2024-08-01 DIAGNOSIS — Z86.711 HISTORY OF PULMONARY EMBOLUS (PE): ICD-10-CM

## 2024-08-01 LAB
POC FINGERSTICK BLOOD GLUCOSE: 121 MG/DL (ref 70–100)
POC HEMOGLOBIN A1C: 6.2 % (ref 4.2–6.5)
POC INR: 2.8
POC PROTHROMBIN TIME: NORMAL

## 2024-08-01 PROCEDURE — 3078F DIAST BP <80 MM HG: CPT | Performed by: NURSE PRACTITIONER

## 2024-08-01 PROCEDURE — 82962 GLUCOSE BLOOD TEST: CPT | Performed by: NURSE PRACTITIONER

## 2024-08-01 PROCEDURE — 1123F ACP DISCUSS/DSCN MKR DOCD: CPT | Performed by: NURSE PRACTITIONER

## 2024-08-01 PROCEDURE — 85610 PROTHROMBIN TIME: CPT | Performed by: NURSE PRACTITIONER

## 2024-08-01 PROCEDURE — 83036 HEMOGLOBIN GLYCOSYLATED A1C: CPT | Performed by: NURSE PRACTITIONER

## 2024-08-01 PROCEDURE — 3074F SYST BP LT 130 MM HG: CPT | Performed by: NURSE PRACTITIONER

## 2024-08-01 PROCEDURE — 99214 OFFICE O/P EST MOD 30 MIN: CPT | Performed by: NURSE PRACTITIONER

## 2024-08-01 PROCEDURE — 1159F MED LIST DOCD IN RCRD: CPT | Performed by: NURSE PRACTITIONER

## 2024-08-01 ASSESSMENT — ENCOUNTER SYMPTOMS
GASTROINTESTINAL NEGATIVE: 1
CARDIOVASCULAR NEGATIVE: 1
MUSCULOSKELETAL NEGATIVE: 1
CONSTITUTIONAL NEGATIVE: 1
ENDOCRINE NEGATIVE: 1
PSYCHIATRIC NEGATIVE: 1
NEUROLOGICAL NEGATIVE: 1
RESPIRATORY NEGATIVE: 1

## 2024-08-01 NOTE — PROGRESS NOTES
Anurag Cummings Jr. is a 78 y.o. here for a diabetic follow up exam.     Pt states they are doing well. Following a low carbohydrate diet and is active.     Up to date with eye and foot exams, pcp visits.     Last wt 221 -- 224 today no swelling   Lsat nbmi 31.36  Last aic 4/24 was 6.1 - no meds has done well with diet/exercise he is very active.    Inr check today - last inr 1.7 (2, 3.3, 2.6, 1.5, 2.4)  - 2.8 today   Warfarin adjusted to: 7.5mg daily (we had him take 10mgx 1 only)    Has had no cp, sob, pressure, pain or swelling no bleeding  Just saw dr guajardo here for his twisted ankle  Eye care utd  Urine micro done with pcp visit normal 6/24          Lab Results   Component Value Date    POCGLU 121 (A) 08/01/2024    POCGLU 84 04/22/2024    POCGLU 84 01/24/2024    HGBA1C 6.2 08/01/2024    HGBA1C 6.1 04/22/2024    HGBA1C 6.5 01/24/2024    HGBA1C 6.6 (A) 06/29/2023     /69 (BP Location: Right arm, Patient Position: Sitting, BP Cuff Size: Large adult)   Pulse 64   Temp 36.6 °C (97.8 °F) (Temporal)   Resp 16   Wt 102 kg (224 lb)   BMI 31.78 kg/m²    Wt Readings from Last 5 Encounters:   08/01/24 102 kg (224 lb)   07/18/24 100 kg (221 lb)   06/20/24 102 kg (224 lb)   06/05/24 102 kg (224 lb)   05/24/24 103 kg (226 lb)          Review of Systems   Constitutional: Negative.    HENT: Negative.     Respiratory: Negative.     Cardiovascular: Negative.    Gastrointestinal: Negative.    Endocrine: Negative.    Genitourinary: Negative.    Musculoskeletal: Negative.    Skin: Negative.    Neurological: Negative.    Psychiatric/Behavioral: Negative.          Physical Exam  Vitals and nursing note reviewed.   Constitutional:       Appearance: Normal appearance.   HENT:      Head: Normocephalic.   Eyes:      Pupils: Pupils are equal, round, and reactive to light.   Cardiovascular:      Rate and Rhythm: Normal rate and regular rhythm.      Heart sounds: Normal heart sounds.   Pulmonary:      Effort: Pulmonary effort is  normal.      Breath sounds: Normal breath sounds.   Skin:     General: Skin is warm and dry.   Neurological:      General: No focal deficit present.      Mental Status: He is alert and oriented to person, place, and time. Mental status is at baseline.   Psychiatric:         Mood and Affect: Mood normal.         Behavior: Behavior normal.         Thought Content: Thought content normal.         Judgment: Judgment normal.      Problem List Items Addressed This Visit             ICD-10-CM    History of DVT (deep vein thrombosis) Z86.718    Relevant Orders    POCT INR manually resulted (Completed)    History of pulmonary embolus (PE) Z86.711    Relevant Orders    POCT INR manually resulted (Completed)    HTN (hypertension) I10    Relevant Orders    POCT INR manually resulted (Completed)     Other Visit Diagnoses         Codes    Type 2 diabetes mellitus with hyperglycemia, without long-term current use of insulin (Multi)    -  Primary E11.65    Relevant Orders    POCT glycosylated hemoglobin (Hb A1C) manually resulted (Completed)    POCT fingerstick glucose manually resulted (Completed)    Albumin-Creatinine Ratio, Urine Random    POCT INR manually resulted (Completed)    Hypercoagulable state (Multi)     D68.59    Relevant Orders    POCT INR manually resulted (Completed)    Anticoagulant long-term use     Z79.01    Relevant Orders    POCT INR manually resulted (Completed)    Paroxysmal atrial fibrillation (Multi)     I48.0    Relevant Orders    POCT INR manually resulted (Completed)           Laura L Seaver, APRN-CNP

## 2024-08-08 ENCOUNTER — EVALUATION (OUTPATIENT)
Dept: PHYSICAL THERAPY | Facility: CLINIC | Age: 79
End: 2024-08-08
Payer: MEDICARE

## 2024-08-08 DIAGNOSIS — M72.2 PLANTAR FASCIAL FIBROMATOSIS: Primary | ICD-10-CM

## 2024-08-08 PROCEDURE — 97110 THERAPEUTIC EXERCISES: CPT | Performed by: PHYSICAL THERAPIST

## 2024-08-08 PROCEDURE — 97162 PT EVAL MOD COMPLEX 30 MIN: CPT | Performed by: PHYSICAL THERAPIST

## 2024-08-08 NOTE — PROGRESS NOTES
Physical Therapy     Physical Therapy Evaluation and Treatment      Patient Name:  Anurag Cummings Jr. 1945   Encounter Diagnosis   Name Primary?    Plantar fascial fibromatosis Yes        THERAPY VISIT NUMBER:1    Today's Date: 8-8-24    REFERRING DR. AGUILAR     SUBJECTIVE:       PAINFUL LEFT PF AND ACHILLES---7/10     GOALS:  SEE FLOW SHEET--DECREASE PAIN SO I CAN WALK NORMALLY     OBJECTIVE:   PAINFUL PF---LEFT ---7/10---SEVERE FLAT FOOT     ASSESSMENT: DEBILITY    PLAN AND TREATMENT:  SEE FLOW SHEET PROGRAM IN CHART:    1 X WEEKLY

## 2024-08-15 ENCOUNTER — APPOINTMENT (OUTPATIENT)
Dept: PHYSICAL THERAPY | Facility: CLINIC | Age: 79
End: 2024-08-15
Payer: COMMERCIAL

## 2024-08-15 DIAGNOSIS — M72.2 PLANTAR FASCIAL FIBROMATOSIS: Primary | ICD-10-CM

## 2024-08-15 PROCEDURE — 97035 APP MDLTY 1+ULTRASOUND EA 15: CPT | Performed by: PHYSICAL THERAPIST

## 2024-08-15 PROCEDURE — 97140 MANUAL THERAPY 1/> REGIONS: CPT | Performed by: PHYSICAL THERAPIST

## 2024-08-15 NOTE — PROGRESS NOTES
Physical Therapy     Physical Therapy Evaluation and Treatment      Patient Name:Anurag Cummings Jr. 1945   Encounter Diagnosis   Name Primary?    Plantar fascial fibromatosis Yes        THERAPY VISIT NUMBER:2    Today's Date: 8-15-24    REFERRING DR. AGUILAR     SUBJECTIVE:        PAINFUL LEFT PF     GOALS:  WALKING BETTER     OBJECTIVE: SEE FLOW SHEET     ASSESSMENT: DEBILITY WITH WALKING    PLAN AND TREATMENT:  SEE FLOW SHEET PROGRAM IN CHART:    1 X WEEKLY

## 2024-08-20 ENCOUNTER — TELEPHONE (OUTPATIENT)
Dept: PRIMARY CARE | Facility: CLINIC | Age: 79
End: 2024-08-20
Payer: MEDICARE

## 2024-08-20 NOTE — TELEPHONE ENCOUNTER
Your orthotics are in.  Please schedule an appointment at your earliest convenience with Dr Quintana to get your new inserts.  You can call [phone] or schedule through [employer].   Thank you.    Physical Therapy Appt Thursday 8/22 will  then

## 2024-08-22 ENCOUNTER — APPOINTMENT (OUTPATIENT)
Dept: PHYSICAL THERAPY | Facility: CLINIC | Age: 79
End: 2024-08-22
Payer: COMMERCIAL

## 2024-08-22 DIAGNOSIS — M72.2 PLANTAR FASCIAL FIBROMATOSIS: Primary | ICD-10-CM

## 2024-08-22 PROCEDURE — 97035 APP MDLTY 1+ULTRASOUND EA 15: CPT | Performed by: PHYSICAL THERAPIST

## 2024-08-22 PROCEDURE — 97140 MANUAL THERAPY 1/> REGIONS: CPT | Performed by: PHYSICAL THERAPIST

## 2024-08-22 NOTE — PROGRESS NOTES
Physical Therapy      Physical Therapy Treatment      Patient Name: Anurag Cummings Jr.     MRN:[id number]      Today's Date: 08/22/24      REFERRING DR Lee/Mariano      SUBJECTIVE:  pt states his foot is just starting to feel better            GOALS:  painfree L foot           OBJECTIVE:  full tx see exercise flowsheet             ASSESSMENT: pt did well, dependent edema in both mallelous and dorsal aspect of feet. Suggested short compression socks for edema , patient may try, he was dispensed with his orthotics today and given info sheet on their wear and use.           PLAN/TREATMENT/VISIT:    continue 1 x weekly

## 2024-08-29 ENCOUNTER — APPOINTMENT (OUTPATIENT)
Dept: PHYSICAL THERAPY | Facility: CLINIC | Age: 79
End: 2024-08-29
Payer: COMMERCIAL

## 2024-08-29 ENCOUNTER — DOCUMENTATION (OUTPATIENT)
Dept: PHARMACY | Facility: HOSPITAL | Age: 79
End: 2024-08-29

## 2024-08-29 DIAGNOSIS — M72.2 PLANTAR FASCIAL FIBROMATOSIS: Primary | ICD-10-CM

## 2024-08-29 PROCEDURE — 97140 MANUAL THERAPY 1/> REGIONS: CPT | Performed by: PHYSICAL THERAPIST

## 2024-08-29 PROCEDURE — 97035 APP MDLTY 1+ULTRASOUND EA 15: CPT | Performed by: PHYSICAL THERAPIST

## 2024-08-29 NOTE — PROGRESS NOTES
Based on Anurag Cummings Jr. 's recent medical history, it looks like Anurag Cummings Jr. could be eligible to switch from warfarin to a direct oral anticoagulant (DOAC).    To facilitate this transition smoothly, the [facility] Clinical Pharmacy team is available to assist with the conversion process. The pharmacists can provide support with medication selection, dosing adjustments, financial aid, and patient education to ensure a seamless switch from warfarin to a DOAC.    Warfarin Managing Provider: Laura Seaver  Managing Provider Specialty: Primary Care     If the the managing provider is interested in pursuing this option, please see instructions below:     Review patient's chart and ensure patient does not have any contraindications to DOACs. The pharmacy team has already done an initial review; however, we encourage a second opinion from the managing provider.   It is strongly recommended that the office staff inform the patient that a referral will be made to the clinical pharmacy team to manage their anticoagulation therapy change. This warm hand off significantly increases the chances of the patient scheduling and working with the pharmacist.   Enter referral to clinical pharmacy as indicated below:   Add Order: Referral to Clinical Pharmacy  Service: NON-APC  Reason for referral: Warfarin conversion - if you prefer a specific DOAC, please indicate that here  The patient will be contacted to set up a telemedicine visit with one of our clinical pharmacists to begin conversion process.   Once the pharmacist meets with the patient, the referring provider will receive ongoing updates.    If the managing provider has any questions, please feel free to reach out.     LONDON BENITEZ

## 2024-08-30 NOTE — PROGRESS NOTES
Physical Therapy     Physical Therapy Evaluation and Treatment      Patient Name:Anurag Cummings Jr. 1945   Encounter Diagnosis   Name Primary?    Plantar fascial fibromatosis Yes        THERAPY VISIT NUMBER: 4    Today's Date:  8-29-24    REFERRING DR. AGUILAR     SUBJECTIVE:        PAINFUL LEFT FOOT IS SLIGHTLY BETTER     GOALS:   CONTINUE TO PROGRESS     OBJECTIVE:   SEE FLOW SHEET     ASSESSMENT:  WALKING DEBILITY    PLAN AND TREATMENT:  SEE FLOW SHEET PROGRAM IN CHART:    1 X WEEKLY

## 2024-09-11 ENCOUNTER — APPOINTMENT (OUTPATIENT)
Dept: PRIMARY CARE | Facility: CLINIC | Age: 79
End: 2024-09-11
Payer: COMMERCIAL

## 2024-09-11 VITALS
HEART RATE: 78 BPM | HEIGHT: 70 IN | OXYGEN SATURATION: 98 % | WEIGHT: 228 LBS | DIASTOLIC BLOOD PRESSURE: 80 MMHG | BODY MASS INDEX: 32.64 KG/M2 | SYSTOLIC BLOOD PRESSURE: 128 MMHG

## 2024-09-11 DIAGNOSIS — Z23 NEED FOR INFLUENZA VACCINATION: ICD-10-CM

## 2024-09-11 DIAGNOSIS — E78.5 HYPERLIPIDEMIA, UNSPECIFIED HYPERLIPIDEMIA TYPE: ICD-10-CM

## 2024-09-11 DIAGNOSIS — D68.59 HYPERCOAGULABLE STATE (MULTI): ICD-10-CM

## 2024-09-11 DIAGNOSIS — Z79.01 ANTICOAGULANT LONG-TERM USE: ICD-10-CM

## 2024-09-11 DIAGNOSIS — Z12.11 SCREENING FOR COLON CANCER: Primary | ICD-10-CM

## 2024-09-11 LAB
POC INR: 2.1
POC PROTHROMBIN TIME: NORMAL

## 2024-09-11 PROCEDURE — 1160F RVW MEDS BY RX/DR IN RCRD: CPT | Performed by: NURSE PRACTITIONER

## 2024-09-11 PROCEDURE — 90662 IIV NO PRSV INCREASED AG IM: CPT | Performed by: NURSE PRACTITIONER

## 2024-09-11 PROCEDURE — 99213 OFFICE O/P EST LOW 20 MIN: CPT | Performed by: NURSE PRACTITIONER

## 2024-09-11 PROCEDURE — 3079F DIAST BP 80-89 MM HG: CPT | Performed by: NURSE PRACTITIONER

## 2024-09-11 PROCEDURE — G0008 ADMIN INFLUENZA VIRUS VAC: HCPCS | Performed by: NURSE PRACTITIONER

## 2024-09-11 PROCEDURE — 1036F TOBACCO NON-USER: CPT | Performed by: NURSE PRACTITIONER

## 2024-09-11 PROCEDURE — 1159F MED LIST DOCD IN RCRD: CPT | Performed by: NURSE PRACTITIONER

## 2024-09-11 PROCEDURE — 3074F SYST BP LT 130 MM HG: CPT | Performed by: NURSE PRACTITIONER

## 2024-09-11 PROCEDURE — 1123F ACP DISCUSS/DSCN MKR DOCD: CPT | Performed by: NURSE PRACTITIONER

## 2024-09-11 PROCEDURE — 85610 PROTHROMBIN TIME: CPT | Performed by: NURSE PRACTITIONER

## 2024-09-11 RX ORDER — ATORVASTATIN CALCIUM 40 MG/1
40 TABLET, FILM COATED ORAL DAILY
COMMUNITY

## 2024-09-11 ASSESSMENT — ENCOUNTER SYMPTOMS
CARDIOVASCULAR NEGATIVE: 1
MUSCULOSKELETAL NEGATIVE: 1
RESPIRATORY NEGATIVE: 1
GASTROINTESTINAL NEGATIVE: 1
CONSTITUTIONAL NEGATIVE: 1
ENDOCRINE NEGATIVE: 1
PSYCHIATRIC NEGATIVE: 1
NEUROLOGICAL NEGATIVE: 1

## 2024-09-11 NOTE — PROGRESS NOTES
Anurag Cummings Jr. male  79 y.o. for long term use of anticoagulation visit.    HPI     Anurag Cummings Jr. is a 79 y.o. here for an INR follow up exam.   Patient has had no chest pain, pressure, palpitations, shortness of breath, or bleeding.      [place] with the boys no issues enjoyed and doing well  Dr reyes visit and increased his atorv to 40mg daily and tolerating  Inr today is 2.1  Warfarin 7.5mg po qd continues follow up one mo  No issues he says dr logan said d/for his cologuard screening and ordered today.                    Review of Systems   Constitutional: Negative.    HENT: Negative.     Respiratory: Negative.     Cardiovascular: Negative.    Gastrointestinal: Negative.    Endocrine: Negative.    Genitourinary: Negative.    Musculoskeletal: Negative.    Skin: Negative.    Neurological: Negative.    Psychiatric/Behavioral: Negative.         Physical Exam  Vitals and nursing note reviewed.   Constitutional:       Appearance: Normal appearance.   HENT:      Head: Normocephalic.   Eyes:      Pupils: Pupils are equal, round, and reactive to light.   Cardiovascular:      Rate and Rhythm: Normal rate and regular rhythm.      Heart sounds: Normal heart sounds.   Pulmonary:      Effort: Pulmonary effort is normal.      Breath sounds: Normal breath sounds.   Skin:     General: Skin is warm and dry.   Neurological:      General: No focal deficit present.      Mental Status: He is alert and oriented to person, place, and time. Mental status is at baseline.   Psychiatric:         Mood and Affect: Mood normal.         Behavior: Behavior normal.         Thought Content: Thought content normal.         Judgment: Judgment normal.          Visit Vitals  /80   Pulse 78      Vitals:    09/11/24 1501   Weight: 103 kg (228 lb)        Anticoagulation Episode Summary       Current INR goal:  --   TTR:  --   Next INR check:  --   INR from last check:  2.10 (9/11/2024)   Weekly max warfarin dose:  --    Target end date:  --   INR check location:  --   Preferred lab:  --   Send INR reminders to:  --       Comments:  --             Anticoagulation Monitoring INR INR Date Last Week Total Next Week Total Sun Mon Antonio Gudino Thu   9/11/2024 2.10 9/11/2024 0 mg 0 mg - - - - -   8/1/2024 2.80 8/1/2024 0 mg 0 mg - - - - -   7/18/2024 1.70 7/18/2024 0 mg 0 mg - - - - -   6/20/2024 2.00 6/20/2024 0 mg 0 mg - - - - -   5/24/2024 3.30 5/24/2024 0 mg 0 mg - - - - -   4/30/2024 2.60 4/30/2024 0 mg 0 mg - - - - -   4/22/2024 1.50 4/22/2024 0 mg 0 mg - - - - -   3/26/2024 2.40 3/26/2024 0 mg 0 mg - - - - -   2/23/2024 2.00 2/23/2024 0 mg 0 mg - - - - -   1/24/2024 3.60 1/24/2024 0 mg 0 mg - - - - -   12/21/2023 3.20 12/21/2023 0 mg 0 mg - - - - -   11/21/2023 3.10 11/21/2023 0 mg 0 mg - - - - -   10/31/2023 3.00 10/31/2023 0 mg 0 mg - - - - -     Anticoagulation Monitoring Fri Sat Visit Report   9/11/2024 - - Report   8/1/2024 - - Report   7/18/2024 - - Report   6/20/2024 - - Report   5/24/2024 - - Report   4/30/2024 - - -   4/22/2024 - - Report   3/26/2024 - - Report   2/23/2024 - - Report   1/24/2024 - - Report   12/21/2023 - - Report   11/21/2023 - - Report   10/31/2023 - - Report      Details                  Problem List Items Addressed This Visit             ICD-10-CM    Hyperlipidemia E78.5     Other Visit Diagnoses         Codes    Screening for colon cancer    -  Primary Z12.11    Relevant Orders    Cologuard® colon cancer screening    Need for influenza vaccination     Z23    Relevant Orders    Flu vaccine, trivalent, preservative free, HIGH-DOSE, age 65y+ (Fluzone) (Completed)    POCT INR manually resulted (Completed)    Hypercoagulable state (Multi)     D68.59    Anticoagulant long-term use     Z79.01                Laura L Seaver, APRN-CNP

## 2024-09-20 ENCOUNTER — DOCUMENTATION (OUTPATIENT)
Dept: PHYSICAL THERAPY | Facility: CLINIC | Age: 79
End: 2024-09-20
Payer: COMMERCIAL

## 2024-09-20 DIAGNOSIS — M72.2 PLANTAR FASCIAL FIBROMATOSIS: Primary | ICD-10-CM

## 2024-09-20 NOTE — PROGRESS NOTES
Physical Therapy    Discharge Summary    Name: Anurag Cummings Jr.  MRN: [id number]  : 1945  Date: 24    Discharge Summary: PT    Discharge Information: Number of attended visits 4    Therapy Summary: Pt received orthotics , will continue HEP    Discharge Status: HEP     Rehab Discharge Reason: Achieved all and/or the most significant goals(s)

## 2024-10-03 ENCOUNTER — TELEPHONE (OUTPATIENT)
Dept: PRIMARY CARE | Facility: CLINIC | Age: 79
End: 2024-10-03
Payer: MEDICARE

## 2024-10-03 LAB — NONINV COLON CA DNA+OCC BLD SCRN STL QL: NEGATIVE

## 2024-10-03 NOTE — RESULT ENCOUNTER NOTE
PLS INFORM NEGATIVE COLOGUARD TESTING TY.   DR CONSUELO Lagunas  I SENT THIS TO YOU FOR YOUR FYI ONLY PT SAID HE PROMISED YOU HE WOULD GET IT DONE :)   JIMENA

## 2024-10-03 NOTE — TELEPHONE ENCOUNTER
----- Message from Laura Seaver sent at 10/3/2024 11:18 AM EDT -----  PLS INFORM NEGATIVE COLOGUARD TESTING TY.   DR CORDERO -  I SENT THIS TO YOU FOR YOUR FYI ONLY PT SAID HE PROMISED YOU HE WOULD GET IT DONE :)   JIMENA

## 2024-10-04 ENCOUNTER — TELEPHONE (OUTPATIENT)
Dept: PRIMARY CARE | Facility: CLINIC | Age: 79
End: 2024-10-04

## 2024-10-04 ENCOUNTER — OFFICE VISIT (OUTPATIENT)
Dept: PRIMARY CARE | Facility: CLINIC | Age: 79
End: 2024-10-04
Payer: MEDICARE

## 2024-10-04 VITALS
HEART RATE: 76 BPM | SYSTOLIC BLOOD PRESSURE: 140 MMHG | TEMPERATURE: 97.5 F | BODY MASS INDEX: 33 KG/M2 | WEIGHT: 230 LBS | RESPIRATION RATE: 16 BRPM | DIASTOLIC BLOOD PRESSURE: 70 MMHG

## 2024-10-04 DIAGNOSIS — D68.59 HYPERCOAGULABLE STATE (MULTI): ICD-10-CM

## 2024-10-04 DIAGNOSIS — Z79.01 LONG TERM (CURRENT) USE OF ANTICOAGULANTS: ICD-10-CM

## 2024-10-04 DIAGNOSIS — S61.215A LACERATION OF LEFT RING FINGER WITHOUT FOREIGN BODY, NAIL DAMAGE STATUS UNSPECIFIED, INITIAL ENCOUNTER: ICD-10-CM

## 2024-10-04 DIAGNOSIS — T14.8XXA ANIMAL BITE: Primary | ICD-10-CM

## 2024-10-04 DIAGNOSIS — Z09 NEED FOR IMMUNIZATION FOLLOW-UP: ICD-10-CM

## 2024-10-04 LAB
POC INR: 3
POC PROTHROMBIN TIME: NORMAL

## 2024-10-04 RX ORDER — AMOXICILLIN AND CLAVULANATE POTASSIUM 875; 125 MG/1; MG/1
875 TABLET, FILM COATED ORAL 2 TIMES DAILY
Qty: 14 TABLET | Refills: 0 | Status: SHIPPED | OUTPATIENT
Start: 2024-10-04 | End: 2024-10-11

## 2024-10-04 ASSESSMENT — ENCOUNTER SYMPTOMS
MUSCULOSKELETAL NEGATIVE: 1
PSYCHIATRIC NEGATIVE: 1
CONSTITUTIONAL NEGATIVE: 1
ENDOCRINE NEGATIVE: 1
NEUROLOGICAL NEGATIVE: 1
RESPIRATORY NEGATIVE: 1
CARDIOVASCULAR NEGATIVE: 1
GASTROINTESTINAL NEGATIVE: 1

## 2024-10-04 NOTE — PROGRESS NOTES
Subjective   Patient ID: Anurag Cummings Jr. is a 79 y.o. male.    HPI  Got bit by a chipmunk yesterday and no temps, no fevers but would like a tetanus shot updated. Cats/house/trapped him/ went to grab him. The hand looks fine see assessment below.  I agree, but i would also like to check an inr so i can put him on prophylactic antibiotic to cover his remote knee replacement.  Has had no redness, fever or chills.     Inr today is 3.0 - he will take 2.5mg x1 daily today since he will be taking augment starting tomorrow. Then back to 7.5mg daily regular regime.     Review of Systems   Constitutional: Negative.    HENT: Negative.     Respiratory: Negative.     Cardiovascular: Negative.    Gastrointestinal: Negative.    Endocrine: Negative.    Genitourinary: Negative.    Musculoskeletal: Negative.    Skin: Negative.    Neurological: Negative.    Psychiatric/Behavioral: Negative.         Objective   Physical Exam  Vitals and nursing note reviewed.   Constitutional:       Appearance: Normal appearance.   HENT:      Head: Normocephalic.   Eyes:      Pupils: Pupils are equal, round, and reactive to light.   Cardiovascular:      Rate and Rhythm: Normal rate and regular rhythm.      Heart sounds: Normal heart sounds.   Pulmonary:      Effort: Pulmonary effort is normal.      Breath sounds: Normal breath sounds.   Musculoskeletal:         General: Normal range of motion.   Skin:     General: Skin is warm and dry.      Comments: Small puncture bite left ringfingerpad of finger tip. Slight edema slight pink does not look infected.    Neurological:      General: No focal deficit present.      Mental Status: He is alert and oriented to person, place, and time. Mental status is at baseline.   Psychiatric:         Mood and Affect: Mood normal.         Behavior: Behavior normal.         Thought Content: Thought content normal.         Judgment: Judgment normal.       Problem List Items Addressed This Visit    None  Visit  Diagnoses         Codes    Animal bite    -  Primary T14.8XXA    Relevant Medications    amoxicillin-pot clavulanate (Augmentin) 875-125 mg tablet    Other Relevant Orders    POCT INR manually resulted (Completed)    Need for immunization follow-up     Z09    Relevant Orders    POCT INR manually resulted (Completed)    Hypercoagulable state (Multi)     D68.59    Relevant Orders    POCT INR manually resulted (Completed)    Long term (current) use of anticoagulants     Z79.01    Relevant Orders    POCT INR manually resulted (Completed)    Laceration of left ring finger without foreign body, nail damage status unspecified, initial encounter     S61.215A    Relevant Orders    Td vaccine, age 7 years and older (TENIVAC) (Completed)

## 2024-10-15 ENCOUNTER — APPOINTMENT (OUTPATIENT)
Dept: PRIMARY CARE | Facility: CLINIC | Age: 79
End: 2024-10-15
Payer: MEDICARE

## 2024-10-15 VITALS
SYSTOLIC BLOOD PRESSURE: 140 MMHG | TEMPERATURE: 97.8 F | DIASTOLIC BLOOD PRESSURE: 76 MMHG | HEART RATE: 60 BPM | RESPIRATION RATE: 16 BRPM | BODY MASS INDEX: 32.28 KG/M2 | WEIGHT: 225 LBS

## 2024-10-15 DIAGNOSIS — Z79.01 LONG TERM (CURRENT) USE OF ANTICOAGULANTS: ICD-10-CM

## 2024-10-15 DIAGNOSIS — T14.8XXA ANIMAL BITE: Primary | ICD-10-CM

## 2024-10-15 DIAGNOSIS — Z86.711 HISTORY OF PULMONARY EMBOLUS (PE): ICD-10-CM

## 2024-10-15 DIAGNOSIS — D68.59 HYPERCOAGULABLE STATE (MULTI): ICD-10-CM

## 2024-10-15 DIAGNOSIS — I48.0 PAROXYSMAL ATRIAL FIBRILLATION (MULTI): ICD-10-CM

## 2024-10-15 LAB
POC INR: 2.4
POC PROTHROMBIN TIME: NORMAL

## 2024-10-15 PROCEDURE — 1123F ACP DISCUSS/DSCN MKR DOCD: CPT | Performed by: NURSE PRACTITIONER

## 2024-10-15 PROCEDURE — 99213 OFFICE O/P EST LOW 20 MIN: CPT | Performed by: NURSE PRACTITIONER

## 2024-10-15 PROCEDURE — 85610 PROTHROMBIN TIME: CPT | Performed by: NURSE PRACTITIONER

## 2024-10-15 PROCEDURE — 3077F SYST BP >= 140 MM HG: CPT | Performed by: NURSE PRACTITIONER

## 2024-10-15 PROCEDURE — 1036F TOBACCO NON-USER: CPT | Performed by: NURSE PRACTITIONER

## 2024-10-15 PROCEDURE — 1159F MED LIST DOCD IN RCRD: CPT | Performed by: NURSE PRACTITIONER

## 2024-10-15 PROCEDURE — 1160F RVW MEDS BY RX/DR IN RCRD: CPT | Performed by: NURSE PRACTITIONER

## 2024-10-15 PROCEDURE — 3078F DIAST BP <80 MM HG: CPT | Performed by: NURSE PRACTITIONER

## 2024-10-15 ASSESSMENT — ENCOUNTER SYMPTOMS
NEUROLOGICAL NEGATIVE: 1
CARDIOVASCULAR NEGATIVE: 1
PSYCHIATRIC NEGATIVE: 1
CONSTITUTIONAL NEGATIVE: 1
RESPIRATORY NEGATIVE: 1
ENDOCRINE NEGATIVE: 1
GASTROINTESTINAL NEGATIVE: 1
MUSCULOSKELETAL NEGATIVE: 1

## 2024-10-15 NOTE — PROGRESS NOTES
Anurag Cummings Jr. male  79 y.o. for long term use of anticoagulation visit.    HPI     Anurag Cummings Jr. is a 79 y.o. here for an INR follow up exam.   Patient has had no chest pain, pressure, palpitations, shortness of breath, or bleeding.      Inr today is 2.4 last 3.0  No changes follow up in 1mo  He was on antibiotics for a chipmonk fingerbite that has since resolved has had no other s/s and full rom and no other issues no fever no chills.        Review of Systems   Constitutional: Negative.    HENT: Negative.     Respiratory: Negative.     Cardiovascular: Negative.    Gastrointestinal: Negative.    Endocrine: Negative.    Genitourinary: Negative.    Musculoskeletal: Negative.    Skin: Negative.    Neurological: Negative.    Psychiatric/Behavioral: Negative.         Physical Exam  Vitals and nursing note reviewed.   Constitutional:       Appearance: Normal appearance.   HENT:      Head: Normocephalic.   Eyes:      Pupils: Pupils are equal, round, and reactive to light.   Cardiovascular:      Rate and Rhythm: Normal rate and regular rhythm.      Heart sounds: Normal heart sounds.   Pulmonary:      Effort: Pulmonary effort is normal.      Breath sounds: Normal breath sounds.   Skin:     General: Skin is warm and dry.   Neurological:      General: No focal deficit present.      Mental Status: He is alert and oriented to person, place, and time. Mental status is at baseline.   Psychiatric:         Mood and Affect: Mood normal.         Behavior: Behavior normal.         Thought Content: Thought content normal.         Judgment: Judgment normal.          Visit Vitals  /76 (BP Location: Left arm, Patient Position: Sitting)   Pulse 60   Temp 36.6 °C (97.8 °F) (Temporal)   Resp 16      Vitals:    10/15/24 1259   Weight: 102 kg (225 lb)        Anticoagulation Episode Summary       Current INR goal:  --   TTR:  --   Next INR check:  --   INR from last check:  2.40 (10/15/2024)   Weekly max warfarin dose:   --   Target end date:  --   INR check location:  --   Preferred lab:  --   Send INR reminders to:  --       Comments:  --             Anticoagulation Monitoring INR INR Date Last Week Total Next Week Total Sun Dudley Hanu   10/15/2024 2.40 10/15/2024 0 mg 0 mg - - - - -   10/4/2024 3.00 10/4/2024 0 mg 0 mg - - - - -   9/11/2024 2.10 9/11/2024 0 mg 0 mg - - - - -   8/1/2024 2.80 8/1/2024 0 mg 0 mg - - - - -   7/18/2024 1.70 7/18/2024 0 mg 0 mg - - - - -   6/20/2024 2.00 6/20/2024 0 mg 0 mg - - - - -   5/24/2024 3.30 5/24/2024 0 mg 0 mg - - - - -   4/30/2024 2.60 4/30/2024 0 mg 0 mg - - - - -   4/22/2024 1.50 4/22/2024 0 mg 0 mg - - - - -   3/26/2024 2.40 3/26/2024 0 mg 0 mg - - - - -   2/23/2024 2.00 2/23/2024 0 mg 0 mg - - - - -   1/24/2024 3.60 1/24/2024 0 mg 0 mg - - - - -   12/21/2023 3.20 12/21/2023 0 mg 0 mg - - - - -   11/21/2023 3.10 11/21/2023 0 mg 0 mg - - - - -   10/31/2023 3.00 10/31/2023 0 mg 0 mg - - - - -     Anticoagulation Monitoring Fri Sat Visit Report   10/15/2024 - - Report   10/4/2024 - - Report   9/11/2024 - - Report   8/1/2024 - - Report   7/18/2024 - - Report   6/20/2024 - - Report   5/24/2024 - - Report   4/30/2024 - - -   4/22/2024 - - Report   3/26/2024 - - Report   2/23/2024 - - Report   1/24/2024 - - Report   12/21/2023 - - Report   11/21/2023 - - Report   10/31/2023 - - Report      Details                  Problem List Items Addressed This Visit             ICD-10-CM    History of pulmonary embolus (PE) Z86.711     Other Visit Diagnoses         Codes    Animal bite    -  Primary T14.8XXA    Hypercoagulable state (Multi)     D68.59    Long term (current) use of anticoagulants     Z79.01    Paroxysmal atrial fibrillation (Multi)     I48.0           Laura L Seaver, APRN-CNP

## 2024-11-12 ENCOUNTER — APPOINTMENT (OUTPATIENT)
Dept: PRIMARY CARE | Facility: CLINIC | Age: 79
End: 2024-11-12
Payer: MEDICARE

## 2024-11-12 VITALS
OXYGEN SATURATION: 98 % | DIASTOLIC BLOOD PRESSURE: 85 MMHG | SYSTOLIC BLOOD PRESSURE: 143 MMHG | HEIGHT: 71 IN | WEIGHT: 226 LBS | BODY MASS INDEX: 31.64 KG/M2 | HEART RATE: 63 BPM

## 2024-11-12 DIAGNOSIS — I48.0 PAROXYSMAL ATRIAL FIBRILLATION (MULTI): ICD-10-CM

## 2024-11-12 DIAGNOSIS — Z86.711 HISTORY OF PULMONARY EMBOLUS (PE): ICD-10-CM

## 2024-11-12 DIAGNOSIS — E11.65 TYPE 2 DIABETES MELLITUS WITH HYPERGLYCEMIA, WITHOUT LONG-TERM CURRENT USE OF INSULIN: Primary | ICD-10-CM

## 2024-11-12 DIAGNOSIS — Z79.01 LONG TERM (CURRENT) USE OF ANTICOAGULANTS: ICD-10-CM

## 2024-11-12 DIAGNOSIS — D68.59 HYPERCOAGULABLE STATE (MULTI): ICD-10-CM

## 2024-11-12 LAB
POC FINGERSTICK BLOOD GLUCOSE: 78 MG/DL (ref 70–100)
POC HEMOGLOBIN A1C: 6.1 % (ref 4.2–6.5)
POC INR: 2.8

## 2024-11-12 PROCEDURE — 3077F SYST BP >= 140 MM HG: CPT | Performed by: NURSE PRACTITIONER

## 2024-11-12 PROCEDURE — 1123F ACP DISCUSS/DSCN MKR DOCD: CPT | Performed by: NURSE PRACTITIONER

## 2024-11-12 PROCEDURE — 82962 GLUCOSE BLOOD TEST: CPT | Performed by: NURSE PRACTITIONER

## 2024-11-12 PROCEDURE — 1160F RVW MEDS BY RX/DR IN RCRD: CPT | Performed by: NURSE PRACTITIONER

## 2024-11-12 PROCEDURE — 85610 PROTHROMBIN TIME: CPT | Performed by: NURSE PRACTITIONER

## 2024-11-12 PROCEDURE — 99214 OFFICE O/P EST MOD 30 MIN: CPT | Performed by: NURSE PRACTITIONER

## 2024-11-12 PROCEDURE — 3079F DIAST BP 80-89 MM HG: CPT | Performed by: NURSE PRACTITIONER

## 2024-11-12 PROCEDURE — 83036 HEMOGLOBIN GLYCOSYLATED A1C: CPT | Performed by: NURSE PRACTITIONER

## 2024-11-12 PROCEDURE — 1159F MED LIST DOCD IN RCRD: CPT | Performed by: NURSE PRACTITIONER

## 2024-11-12 ASSESSMENT — ENCOUNTER SYMPTOMS
NEUROLOGICAL NEGATIVE: 1
CONSTITUTIONAL NEGATIVE: 1
CARDIOVASCULAR NEGATIVE: 1
RESPIRATORY NEGATIVE: 1
MUSCULOSKELETAL NEGATIVE: 1
GASTROINTESTINAL NEGATIVE: 1
PSYCHIATRIC NEGATIVE: 1
ENDOCRINE NEGATIVE: 1

## 2024-11-12 NOTE — PROGRESS NOTES
Anurag Cummings Jr. male  79 y.o. for long term use of anticoagulation visit.    HPI     Anurag Cummings Jr. is a 79 y.o. here for an INR follow up exam.   Patient has had no chest pain, pressure, palpitations, shortness of breath, or bleeding.      Inr 3.0 repeat 2.8   Add more greens in the diet - will continue usual dose of 7.5mg daily    Aic 6.1 %  (6.2)     1mo f/u      Review of Systems   Constitutional: Negative.    HENT: Negative.     Respiratory: Negative.     Cardiovascular: Negative.    Gastrointestinal: Negative.    Endocrine: Negative.    Genitourinary: Negative.    Musculoskeletal: Negative.    Skin: Negative.    Neurological: Negative.    Psychiatric/Behavioral: Negative.         Physical Exam  Vitals and nursing note reviewed.   Constitutional:       Appearance: Normal appearance.   HENT:      Head: Normocephalic.   Eyes:      Pupils: Pupils are equal, round, and reactive to light.   Cardiovascular:      Rate and Rhythm: Normal rate and regular rhythm.      Heart sounds: Normal heart sounds.   Pulmonary:      Effort: Pulmonary effort is normal.      Breath sounds: Normal breath sounds.   Skin:     General: Skin is warm and dry.   Neurological:      General: No focal deficit present.      Mental Status: He is alert and oriented to person, place, and time. Mental status is at baseline.   Psychiatric:         Mood and Affect: Mood normal.         Behavior: Behavior normal.         Thought Content: Thought content normal.         Judgment: Judgment normal.          Visit Vitals  /85 (BP Location: Left arm, Patient Position: Sitting, BP Cuff Size: Adult)   Pulse 63      Vitals:    11/12/24 1306   Weight: 103 kg (226 lb)        Anticoagulation Episode Summary       Current INR goal:  --   TTR:  --   Next INR check:  --   INR from last check:  3.00 (11/12/2024)    The selected INR value is not up to date. The INR has been changed to 2.8 since it was selected in this encounter.   Weekly max  warfarin dose:  --   Target end date:  --   INR check location:  --   Preferred lab:  --   Send INR reminders to:  --       Comments:  --             Anticoagulation Monitoring INR INR Date Last Week Total Next Week Total Sun Dudley Hanu   11/12/2024 3.00 11/12/2024 0 mg 0 mg - - - - -   10/15/2024 2.40 10/15/2024 0 mg 0 mg - - - - -   10/4/2024 3.00 10/4/2024 0 mg 0 mg - - - - -   9/11/2024 2.10 9/11/2024 0 mg 0 mg - - - - -   8/1/2024 2.80 8/1/2024 0 mg 0 mg - - - - -   7/18/2024 1.70 7/18/2024 0 mg 0 mg - - - - -   6/20/2024 2.00 6/20/2024 0 mg 0 mg - - - - -   5/24/2024 3.30 5/24/2024 0 mg 0 mg - - - - -   4/30/2024 2.60 4/30/2024 0 mg 0 mg - - - - -   4/22/2024 1.50 4/22/2024 0 mg 0 mg - - - - -   3/26/2024 2.40 3/26/2024 0 mg 0 mg - - - - -   2/23/2024 2.00 2/23/2024 0 mg 0 mg - - - - -   1/24/2024 3.60 1/24/2024 0 mg 0 mg - - - - -   12/21/2023 3.20 12/21/2023 0 mg 0 mg - - - - -   11/21/2023 3.10 11/21/2023 0 mg 0 mg - - - - -   10/31/2023 3.00 10/31/2023 0 mg 0 mg - - - - -     Anticoagulation Monitoring Fri Sat Visit Report   11/12/2024 - - Report   10/15/2024 - - Report   10/4/2024 - - Report   9/11/2024 - - Report   8/1/2024 - - Report   7/18/2024 - - Report   6/20/2024 - - Report   5/24/2024 - - Report   4/30/2024 - - -   4/22/2024 - - Report   3/26/2024 - - Report   2/23/2024 - - Report   1/24/2024 - - Report   12/21/2023 - - Report   11/21/2023 - - Report   10/31/2023 - - Report      Details                  Problem List Items Addressed This Visit             ICD-10-CM    History of pulmonary embolus (PE) Z86.711     Other Visit Diagnoses         Codes    Type 2 diabetes mellitus with hyperglycemia, without long-term current use of insulin    -  Primary E11.65    Relevant Orders    POCT glycosylated hemoglobin (Hb A1C) manually resulted (Completed)    POCT fingerstick glucose manually resulted (Completed)    POCT INR manually resulted (Completed)    Hypercoagulable state (Multi)     D68.59    Long  term (current) use of anticoagulants     Z79.01    Paroxysmal atrial fibrillation (Multi)     I48.0           Laura L Seaver, APRN-CNP

## 2024-11-27 ENCOUNTER — TELEPHONE (OUTPATIENT)
Dept: PRIMARY CARE | Facility: CLINIC | Age: 79
End: 2024-11-27
Payer: MEDICARE

## 2024-11-27 NOTE — TELEPHONE ENCOUNTER
Issue re: Abrysvo vaccine was resolved. Ins co checked their records and pt did not have vaccine in last 365 days. Vaccine approved.

## 2024-12-12 ENCOUNTER — APPOINTMENT (OUTPATIENT)
Dept: PRIMARY CARE | Facility: CLINIC | Age: 79
End: 2024-12-12
Payer: COMMERCIAL

## 2024-12-12 VITALS
WEIGHT: 230 LBS | HEART RATE: 64 BPM | DIASTOLIC BLOOD PRESSURE: 68 MMHG | SYSTOLIC BLOOD PRESSURE: 130 MMHG | BODY MASS INDEX: 32.08 KG/M2 | RESPIRATION RATE: 16 BRPM | TEMPERATURE: 96.7 F

## 2024-12-12 DIAGNOSIS — R73.03 PREDIABETES: ICD-10-CM

## 2024-12-12 DIAGNOSIS — D68.59 HYPERCOAGULABLE STATE (MULTI): Primary | ICD-10-CM

## 2024-12-12 DIAGNOSIS — L71.9 ROSACEA: ICD-10-CM

## 2024-12-12 LAB
POC INR: 2.2
POC PROTHROMBIN TIME: NORMAL

## 2024-12-12 PROCEDURE — 1159F MED LIST DOCD IN RCRD: CPT | Performed by: NURSE PRACTITIONER

## 2024-12-12 PROCEDURE — 3078F DIAST BP <80 MM HG: CPT | Performed by: NURSE PRACTITIONER

## 2024-12-12 PROCEDURE — 1036F TOBACCO NON-USER: CPT | Performed by: NURSE PRACTITIONER

## 2024-12-12 PROCEDURE — 85610 PROTHROMBIN TIME: CPT | Performed by: NURSE PRACTITIONER

## 2024-12-12 PROCEDURE — 3075F SYST BP GE 130 - 139MM HG: CPT | Performed by: NURSE PRACTITIONER

## 2024-12-12 PROCEDURE — 1123F ACP DISCUSS/DSCN MKR DOCD: CPT | Performed by: NURSE PRACTITIONER

## 2024-12-12 PROCEDURE — 99213 OFFICE O/P EST LOW 20 MIN: CPT | Performed by: NURSE PRACTITIONER

## 2024-12-12 RX ORDER — FUROSEMIDE 20 MG/1
TABLET ORAL
Qty: 90 TABLET | Refills: 1 | Status: SHIPPED | OUTPATIENT
Start: 2024-12-12

## 2024-12-12 RX ORDER — DOXYCYCLINE HYCLATE 20 MG
40 TABLET ORAL DAILY
Qty: 180 CAPSULE | Refills: 1 | Status: SHIPPED | OUTPATIENT
Start: 2024-12-12 | End: 2025-03-12

## 2024-12-12 ASSESSMENT — ENCOUNTER SYMPTOMS
ENDOCRINE NEGATIVE: 1
PSYCHIATRIC NEGATIVE: 1
GASTROINTESTINAL NEGATIVE: 1
CARDIOVASCULAR NEGATIVE: 1
RESPIRATORY NEGATIVE: 1
MUSCULOSKELETAL NEGATIVE: 1
NEUROLOGICAL NEGATIVE: 1
CONSTITUTIONAL NEGATIVE: 1

## 2024-12-12 NOTE — PROGRESS NOTES
Anurag Cummings Jr. male  79 y.o. for long term use of anticoagulation visit.    HPI     Anurag Cummings Jr. is a 79 y.o. here for an INR follow up exam.   Patient has had no chest pain, pressure, palpitations, shortness of breath, or bleeding.  Needs his chronic doxy per derm refill and metop. Continues to see both derm and card.     Warfarin 7.5mg daily continues - inr is 2.2 today. He feels well      Review of Systems   Constitutional: Negative.    HENT: Negative.     Respiratory: Negative.     Cardiovascular: Negative.    Gastrointestinal: Negative.    Endocrine: Negative.    Genitourinary: Negative.    Musculoskeletal: Negative.    Skin: Negative.    Neurological: Negative.    Psychiatric/Behavioral: Negative.         Physical Exam  Vitals and nursing note reviewed.   Constitutional:       Appearance: Normal appearance.   HENT:      Head: Normocephalic.   Eyes:      Pupils: Pupils are equal, round, and reactive to light.   Cardiovascular:      Rate and Rhythm: Normal rate and regular rhythm.      Heart sounds: Normal heart sounds.   Pulmonary:      Effort: Pulmonary effort is normal.      Breath sounds: Normal breath sounds.   Skin:     General: Skin is warm and dry.   Neurological:      General: No focal deficit present.      Mental Status: He is alert and oriented to person, place, and time. Mental status is at baseline.   Psychiatric:         Mood and Affect: Mood normal.         Behavior: Behavior normal.         Thought Content: Thought content normal.         Judgment: Judgment normal.          Visit Vitals  /68 (BP Location: Left arm, Patient Position: Sitting)   Pulse 64   Temp 35.9 °C (96.7 °F) (Temporal)   Resp 16      Vitals:    12/12/24 1309   Weight: 104 kg (230 lb)        Anticoagulation Episode Summary       Current INR goal:  --   TTR:  --   Next INR check:  --   INR from last check:  2.20 (12/12/2024)   Weekly max warfarin dose:  --   Target end date:  --   INR check location:  --    Preferred lab:  --   Send INR reminders to:  --       Comments:  --             Anticoagulation Monitoring INR INR Date Last Week Total Next Week Total Sun Dudley Gudino Thu   12/12/2024 2.20 12/12/2024 0 mg 0 mg - - - - -   11/12/2024 3.00 11/12/2024 0 mg 0 mg - - - - -   10/15/2024 2.40 10/15/2024 0 mg 0 mg - - - - -   10/4/2024 3.00 10/4/2024 0 mg 0 mg - - - - -   9/11/2024 2.10 9/11/2024 0 mg 0 mg - - - - -   8/1/2024 2.80 8/1/2024 0 mg 0 mg - - - - -   7/18/2024 1.70 7/18/2024 0 mg 0 mg - - - - -   6/20/2024 2.00 6/20/2024 0 mg 0 mg - - - - -   5/24/2024 3.30 5/24/2024 0 mg 0 mg - - - - -   4/30/2024 2.60 4/30/2024 0 mg 0 mg - - - - -   4/22/2024 1.50 4/22/2024 0 mg 0 mg - - - - -   3/26/2024 2.40 3/26/2024 0 mg 0 mg - - - - -   2/23/2024 2.00 2/23/2024 0 mg 0 mg - - - - -   1/24/2024 3.60 1/24/2024 0 mg 0 mg - - - - -   12/21/2023 3.20 12/21/2023 0 mg 0 mg - - - - -   11/21/2023 3.10 11/21/2023 0 mg 0 mg - - - - -   10/31/2023 3.00 10/31/2023 0 mg 0 mg - - - - -     Anticoagulation Monitoring Fri Sat Visit Report   12/12/2024 - - Report   11/12/2024 - - Report   10/15/2024 - - Report   10/4/2024 - - Report   9/11/2024 - - Report   8/1/2024 - - Report   7/18/2024 - - Report   6/20/2024 - - Report   5/24/2024 - - Report   4/30/2024 - - -   4/22/2024 - - Report   3/26/2024 - - Report   2/23/2024 - - Report   1/24/2024 - - Report   12/21/2023 - - Report   11/21/2023 - - Report   10/31/2023 - - Report      Details                  Problem List Items Addressed This Visit             ICD-10-CM    Rosacea L71.9    Relevant Medications    doxycycline (Periostat) 20 mg tablet     Other Visit Diagnoses         Codes    Hypercoagulable state (Multi)    -  Primary D68.59    Relevant Medications    furosemide (Lasix) 20 mg tablet    Prediabetes     R73.03    Relevant Medications    furosemide (Lasix) 20 mg tablet                Laura L Seaver, APRN-CNP

## 2025-01-10 ENCOUNTER — APPOINTMENT (OUTPATIENT)
Dept: PRIMARY CARE | Facility: CLINIC | Age: 80
End: 2025-01-10
Payer: MEDICARE

## 2025-01-10 VITALS
HEART RATE: 63 BPM | DIASTOLIC BLOOD PRESSURE: 80 MMHG | TEMPERATURE: 97.5 F | WEIGHT: 224 LBS | SYSTOLIC BLOOD PRESSURE: 140 MMHG | BODY MASS INDEX: 31.24 KG/M2 | RESPIRATION RATE: 16 BRPM

## 2025-01-10 DIAGNOSIS — D68.59 HYPERCOAGULABLE STATE (MULTI): Primary | ICD-10-CM

## 2025-01-10 DIAGNOSIS — Z79.01 LONG TERM (CURRENT) USE OF ANTICOAGULANTS: ICD-10-CM

## 2025-01-10 DIAGNOSIS — I48.0 PAROXYSMAL ATRIAL FIBRILLATION (MULTI): ICD-10-CM

## 2025-01-10 DIAGNOSIS — Z86.711 HISTORY OF PULMONARY EMBOLUS (PE): ICD-10-CM

## 2025-01-10 LAB
POC INR: 3.1
POC PROTHROMBIN TIME: NORMAL

## 2025-01-10 PROCEDURE — 1036F TOBACCO NON-USER: CPT | Performed by: NURSE PRACTITIONER

## 2025-01-10 PROCEDURE — 1123F ACP DISCUSS/DSCN MKR DOCD: CPT | Performed by: NURSE PRACTITIONER

## 2025-01-10 PROCEDURE — 85610 PROTHROMBIN TIME: CPT | Performed by: NURSE PRACTITIONER

## 2025-01-10 PROCEDURE — 3079F DIAST BP 80-89 MM HG: CPT | Performed by: NURSE PRACTITIONER

## 2025-01-10 PROCEDURE — 99213 OFFICE O/P EST LOW 20 MIN: CPT | Performed by: NURSE PRACTITIONER

## 2025-01-10 PROCEDURE — 1159F MED LIST DOCD IN RCRD: CPT | Performed by: NURSE PRACTITIONER

## 2025-01-10 PROCEDURE — 1160F RVW MEDS BY RX/DR IN RCRD: CPT | Performed by: NURSE PRACTITIONER

## 2025-01-10 PROCEDURE — 3077F SYST BP >= 140 MM HG: CPT | Performed by: NURSE PRACTITIONER

## 2025-01-10 ASSESSMENT — ENCOUNTER SYMPTOMS
CARDIOVASCULAR NEGATIVE: 1
ENDOCRINE NEGATIVE: 1
PSYCHIATRIC NEGATIVE: 1
RESPIRATORY NEGATIVE: 1
NEUROLOGICAL NEGATIVE: 1
CONSTITUTIONAL NEGATIVE: 1
GASTROINTESTINAL NEGATIVE: 1
MUSCULOSKELETAL NEGATIVE: 1

## 2025-01-10 NOTE — PROGRESS NOTES
Anurag Cummings Jr. male  79 y.o. for long term use of anticoagulation visit.    HPI     Anurag Cummings Jr. is a 79 y.o. here for an INR follow up exam.   Patient has had no chest pain, pressure, palpitations, shortness of breath, or bleeding.      Last inr 2.2  Inr today 3.1  He will increase his greens, 1/2 tomorrow tab and return to 7.5mg daily  He will continue 7.5mg all days  F/u inr 3 wks    Review of Systems   Constitutional: Negative.    HENT: Negative.     Respiratory: Negative.     Cardiovascular: Negative.    Gastrointestinal: Negative.    Endocrine: Negative.    Genitourinary: Negative.    Musculoskeletal: Negative.    Skin: Negative.    Neurological: Negative.    Psychiatric/Behavioral: Negative.         Physical Exam  Vitals and nursing note reviewed.   Constitutional:       Appearance: Normal appearance.   HENT:      Head: Normocephalic.   Eyes:      Pupils: Pupils are equal, round, and reactive to light.   Cardiovascular:      Rate and Rhythm: Normal rate and regular rhythm.      Heart sounds: Normal heart sounds.   Pulmonary:      Effort: Pulmonary effort is normal.      Breath sounds: Normal breath sounds.   Skin:     General: Skin is warm and dry.   Neurological:      General: No focal deficit present.      Mental Status: He is alert and oriented to person, place, and time. Mental status is at baseline.   Psychiatric:         Mood and Affect: Mood normal.         Behavior: Behavior normal.         Thought Content: Thought content normal.         Judgment: Judgment normal.          Visit Vitals  /80 (BP Location: Left arm, Patient Position: Sitting, BP Cuff Size: Large adult)   Pulse 63   Temp 36.4 °C (97.5 °F) (Temporal)   Resp 16      Vitals:    01/10/25 1415   Weight: 102 kg (224 lb)        Anticoagulation Episode Summary       Current INR goal:  --   TTR:  --   Next INR check:  --   INR from last check:  3.10 (1/10/2025)   Weekly max warfarin dose:  --   Target end date:  --    INR check location:  --   Preferred lab:  --   Send INR reminders to:  --       Comments:  --             Anticoagulation Monitoring INR INR Date Last Week Total Next Week Total Sun Mon Antonio Gudino Thu   1/10/2025 3.10 1/10/2025 0 mg 0 mg - - - - -   12/12/2024 2.20 12/12/2024 0 mg 0 mg - - - - -   11/12/2024 3.00 11/12/2024 0 mg 0 mg - - - - -   10/15/2024 2.40 10/15/2024 0 mg 0 mg - - - - -   10/4/2024 3.00 10/4/2024 0 mg 0 mg - - - - -   9/11/2024 2.10 9/11/2024 0 mg 0 mg - - - - -   8/1/2024 2.80 8/1/2024 0 mg 0 mg - - - - -   7/18/2024 1.70 7/18/2024 0 mg 0 mg - - - - -   6/20/2024 2.00 6/20/2024 0 mg 0 mg - - - - -   5/24/2024 3.30 5/24/2024 0 mg 0 mg - - - - -   4/30/2024 2.60 4/30/2024 0 mg 0 mg - - - - -   4/22/2024 1.50 4/22/2024 0 mg 0 mg - - - - -   3/26/2024 2.40 3/26/2024 0 mg 0 mg - - - - -   2/23/2024 2.00 2/23/2024 0 mg 0 mg - - - - -   1/24/2024 3.60 1/24/2024 0 mg 0 mg - - - - -   12/21/2023 3.20 12/21/2023 0 mg 0 mg - - - - -   11/21/2023 3.10 11/21/2023 0 mg 0 mg - - - - -   10/31/2023 3.00 10/31/2023 0 mg 0 mg - - - - -     Anticoagulation Monitoring Fri Sat Visit Report   1/10/2025 - - Report   12/12/2024 - - Report   11/12/2024 - - Report   10/15/2024 - - Report   10/4/2024 - - Report   9/11/2024 - - Report   8/1/2024 - - Report   7/18/2024 - - Report   6/20/2024 - - Report   5/24/2024 - - Report   4/30/2024 - - -   4/22/2024 - - Report   3/26/2024 - - Report   2/23/2024 - - Report   1/24/2024 - - Report   12/21/2023 - - Report   11/21/2023 - - Report   10/31/2023 - - Report      Details                  Problem List Items Addressed This Visit             ICD-10-CM    History of pulmonary embolus (PE) Z86.711    Relevant Orders    POCT INR manually resulted (Completed)     Other Visit Diagnoses         Codes    Hypercoagulable state (Multi)    -  Primary D68.59    Relevant Orders    POCT INR manually resulted (Completed)    Long term (current) use of anticoagulants     Z79.01    Relevant Orders     POCT INR manually resulted (Completed)    Paroxysmal atrial fibrillation (Multi)     I48.0    Relevant Orders    POCT INR manually resulted (Completed)         Laura L Seaver, APRN-CNP

## 2025-02-12 ENCOUNTER — APPOINTMENT (OUTPATIENT)
Dept: PRIMARY CARE | Facility: CLINIC | Age: 80
End: 2025-02-12
Payer: MEDICARE

## 2025-02-12 VITALS
TEMPERATURE: 98 F | BODY MASS INDEX: 32.48 KG/M2 | HEIGHT: 71 IN | RESPIRATION RATE: 16 BRPM | DIASTOLIC BLOOD PRESSURE: 82 MMHG | WEIGHT: 232 LBS | HEART RATE: 68 BPM | SYSTOLIC BLOOD PRESSURE: 120 MMHG

## 2025-02-12 DIAGNOSIS — I10 HYPERTENSION, UNSPECIFIED TYPE: ICD-10-CM

## 2025-02-12 DIAGNOSIS — I48.0 PAROXYSMAL ATRIAL FIBRILLATION (MULTI): ICD-10-CM

## 2025-02-12 DIAGNOSIS — Z79.01 ANTICOAGULANT LONG-TERM USE: ICD-10-CM

## 2025-02-12 DIAGNOSIS — L71.9 ROSACEA: ICD-10-CM

## 2025-02-12 DIAGNOSIS — Z86.711 HISTORY OF PULMONARY EMBOLUS (PE): ICD-10-CM

## 2025-02-12 DIAGNOSIS — D68.59 HYPERCOAGULABLE STATE (MULTI): ICD-10-CM

## 2025-02-12 DIAGNOSIS — I15.9 SECONDARY HYPERTENSION: ICD-10-CM

## 2025-02-12 DIAGNOSIS — Z86.718 HISTORY OF DVT (DEEP VEIN THROMBOSIS): ICD-10-CM

## 2025-02-12 DIAGNOSIS — R73.03 PREDIABETES: Primary | ICD-10-CM

## 2025-02-12 DIAGNOSIS — E11.65 TYPE 2 DIABETES MELLITUS WITH HYPERGLYCEMIA, WITHOUT LONG-TERM CURRENT USE OF INSULIN: ICD-10-CM

## 2025-02-12 LAB
POC FINGERSTICK BLOOD GLUCOSE: 118 MG/DL (ref 70–100)
POC HEMOGLOBIN A1C: 6.2 % (ref 4.2–6.5)
POC INR: 2.6
POC PROTHROMBIN TIME: NORMAL

## 2025-02-12 PROCEDURE — 1123F ACP DISCUSS/DSCN MKR DOCD: CPT | Performed by: NURSE PRACTITIONER

## 2025-02-12 PROCEDURE — 1160F RVW MEDS BY RX/DR IN RCRD: CPT | Performed by: NURSE PRACTITIONER

## 2025-02-12 PROCEDURE — 1159F MED LIST DOCD IN RCRD: CPT | Performed by: NURSE PRACTITIONER

## 2025-02-12 PROCEDURE — 99214 OFFICE O/P EST MOD 30 MIN: CPT | Performed by: NURSE PRACTITIONER

## 2025-02-12 PROCEDURE — 83036 HEMOGLOBIN GLYCOSYLATED A1C: CPT | Performed by: NURSE PRACTITIONER

## 2025-02-12 PROCEDURE — 3079F DIAST BP 80-89 MM HG: CPT | Performed by: NURSE PRACTITIONER

## 2025-02-12 PROCEDURE — 85610 PROTHROMBIN TIME: CPT | Performed by: NURSE PRACTITIONER

## 2025-02-12 PROCEDURE — 82962 GLUCOSE BLOOD TEST: CPT | Performed by: NURSE PRACTITIONER

## 2025-02-12 PROCEDURE — 1036F TOBACCO NON-USER: CPT | Performed by: NURSE PRACTITIONER

## 2025-02-12 PROCEDURE — 3074F SYST BP LT 130 MM HG: CPT | Performed by: NURSE PRACTITIONER

## 2025-02-12 RX ORDER — LOSARTAN POTASSIUM 25 MG/1
25 TABLET ORAL 2 TIMES DAILY
Qty: 180 TABLET | Refills: 3 | Status: SHIPPED | OUTPATIENT
Start: 2025-02-12

## 2025-02-12 RX ORDER — WARFARIN SODIUM 5 MG/1
TABLET ORAL
Qty: 90 TABLET | Refills: 3 | Status: SHIPPED | OUTPATIENT
Start: 2025-02-12

## 2025-02-12 RX ORDER — FUROSEMIDE 20 MG/1
TABLET ORAL
Qty: 90 TABLET | Refills: 3 | Status: SHIPPED | OUTPATIENT
Start: 2025-02-12

## 2025-02-12 RX ORDER — METOPROLOL SUCCINATE 25 MG/1
25 TABLET, EXTENDED RELEASE ORAL DAILY
Qty: 90 TABLET | Refills: 3 | Status: SHIPPED | OUTPATIENT
Start: 2025-02-12

## 2025-02-12 RX ORDER — DOXYCYCLINE HYCLATE 20 MG
40 TABLET ORAL DAILY
Qty: 60 TABLET | Refills: 2 | Status: SHIPPED | OUTPATIENT
Start: 2025-02-12 | End: 2025-05-13

## 2025-02-12 ASSESSMENT — ENCOUNTER SYMPTOMS
MUSCULOSKELETAL NEGATIVE: 1
RESPIRATORY NEGATIVE: 1
CARDIOVASCULAR NEGATIVE: 1
ENDOCRINE NEGATIVE: 1
CONSTITUTIONAL NEGATIVE: 1
GASTROINTESTINAL NEGATIVE: 1
PSYCHIATRIC NEGATIVE: 1
NEUROLOGICAL NEGATIVE: 1

## 2025-02-12 NOTE — PROGRESS NOTES
" Anurag Cummings Jr. is a 79 y.o. here for a diabetic follow up exam.     Pt states they are doing well. Following a low carbohydrate diet and is active.     Up to date with eye and foot exams, pcp visits.     Aic 6.2, prior 6.1, 6.2, 6.1, 6.5 - wt about same after holidays  Inr today 2.6  Will continue the 7.5mg daily warfarin dosing  F/u 1mo  Error on last wt  Med ref sent to mail away needed       Lab Results   Component Value Date    POCGLU 118 (A) 02/12/2025    POCGLU 78 11/12/2024    POCGLU 121 (A) 08/01/2024    POCGLU 84 04/22/2024    POCGLU 84 01/24/2024    HGBA1C 6.2 02/12/2025    HGBA1C 6.1 11/12/2024    HGBA1C 6.2 08/01/2024    HGBA1C 6.1 04/22/2024    HGBA1C 6.5 01/24/2024     /82 (BP Location: Left arm, Patient Position: Sitting)   Pulse 68   Temp 36.7 °C (98 °F) (Temporal)   Resp 16   Ht 1.803 m (5' 11\")   Wt 105 kg (232 lb)   BMI 32.36 kg/m²    Wt Readings from Last 5 Encounters:   02/12/25 105 kg (232 lb)   01/10/25 102 kg (224 lb)   12/12/24 104 kg (230 lb)   11/12/24 103 kg (226 lb)   10/15/24 102 kg (225 lb)          Lab Results   Component Value Date    ALBUMINUR <7.0 06/06/2024    CREATU 41.1 06/06/2024    MICROALBCREA  06/06/2024      Comment:      One or more analytes used in this calculation is outside of the analytical measurement range. Calculation cannot be performed.        Review of Systems   Constitutional: Negative.    HENT: Negative.     Respiratory: Negative.     Cardiovascular: Negative.    Gastrointestinal: Negative.    Endocrine: Negative.    Genitourinary: Negative.    Musculoskeletal: Negative.    Skin: Negative.    Neurological: Negative.    Psychiatric/Behavioral: Negative.          Physical Exam  Vitals and nursing note reviewed.   Constitutional:       Appearance: Normal appearance.   HENT:      Head: Normocephalic.   Eyes:      Pupils: Pupils are equal, round, and reactive to light.   Cardiovascular:      Rate and Rhythm: Normal rate and regular rhythm.      " Heart sounds: Normal heart sounds.   Pulmonary:      Effort: Pulmonary effort is normal.      Breath sounds: Normal breath sounds.   Skin:     General: Skin is warm and dry.   Neurological:      General: No focal deficit present.      Mental Status: He is alert and oriented to person, place, and time. Mental status is at baseline.   Psychiatric:         Mood and Affect: Mood normal.         Behavior: Behavior normal.         Thought Content: Thought content normal.         Judgment: Judgment normal.          Problem List Items Addressed This Visit       History of DVT (deep vein thrombosis)    Relevant Orders    POCT INR manually resulted (Completed)    History of pulmonary embolus (PE)    Relevant Orders    POCT INR manually resulted (Completed)    HTN (hypertension)    Relevant Medications    metoprolol succinate XL (Toprol-XL) 25 mg 24 hr tablet    losartan (Cozaar) 25 mg tablet    Other Relevant Orders    POCT INR manually resulted (Completed)    Rosacea    Relevant Medications    doxycycline (Periostat) 20 mg tablet     Other Visit Diagnoses       Prediabetes    -  Primary    Relevant Medications    furosemide (Lasix) 20 mg tablet    Other Relevant Orders    POCT INR manually resulted (Completed)    Anticoagulant long-term use        Relevant Medications    warfarin (Coumadin) 5 mg tablet    Other Relevant Orders    POCT INR manually resulted (Completed)    Paroxysmal atrial fibrillation (Multi)        Relevant Medications    metoprolol succinate XL (Toprol-XL) 25 mg 24 hr tablet    Other Relevant Orders    POCT INR manually resulted (Completed)    Hypercoagulable state (Multi)        Relevant Medications    furosemide (Lasix) 20 mg tablet    Other Relevant Orders    POCT INR manually resulted (Completed)    Type 2 diabetes mellitus with hyperglycemia, without long-term current use of insulin        Relevant Orders    POCT glycosylated hemoglobin (Hb A1C) manually resulted (Completed)    POCT fingerstick  glucose manually resulted (Completed)    POCT INR manually resulted (Completed)             Laura L Seaver, APRN-CNP

## 2025-03-12 ENCOUNTER — APPOINTMENT (OUTPATIENT)
Dept: PRIMARY CARE | Facility: CLINIC | Age: 80
End: 2025-03-12
Payer: COMMERCIAL

## 2025-03-12 VITALS
HEART RATE: 72 BPM | WEIGHT: 230 LBS | HEIGHT: 71 IN | OXYGEN SATURATION: 94 % | BODY MASS INDEX: 32.2 KG/M2 | DIASTOLIC BLOOD PRESSURE: 76 MMHG | TEMPERATURE: 97.7 F | SYSTOLIC BLOOD PRESSURE: 164 MMHG

## 2025-03-12 DIAGNOSIS — Z86.718 HISTORY OF DVT (DEEP VEIN THROMBOSIS): Primary | ICD-10-CM

## 2025-03-12 DIAGNOSIS — Z79.01 ANTICOAGULANT LONG-TERM USE: ICD-10-CM

## 2025-03-12 DIAGNOSIS — I10 HYPERTENSION, UNSPECIFIED TYPE: ICD-10-CM

## 2025-03-12 DIAGNOSIS — Z86.711 HISTORY OF PULMONARY EMBOLUS (PE): ICD-10-CM

## 2025-03-12 DIAGNOSIS — I48.0 PAROXYSMAL ATRIAL FIBRILLATION (MULTI): ICD-10-CM

## 2025-03-12 LAB
POC INR: 3.4
POC PROTHROMBIN TIME: NORMAL

## 2025-03-12 PROCEDURE — 3077F SYST BP >= 140 MM HG: CPT | Performed by: NURSE PRACTITIONER

## 2025-03-12 PROCEDURE — 85610 PROTHROMBIN TIME: CPT | Performed by: NURSE PRACTITIONER

## 2025-03-12 PROCEDURE — 1036F TOBACCO NON-USER: CPT | Performed by: NURSE PRACTITIONER

## 2025-03-12 PROCEDURE — 99213 OFFICE O/P EST LOW 20 MIN: CPT | Performed by: NURSE PRACTITIONER

## 2025-03-12 PROCEDURE — 3078F DIAST BP <80 MM HG: CPT | Performed by: NURSE PRACTITIONER

## 2025-03-12 PROCEDURE — 1159F MED LIST DOCD IN RCRD: CPT | Performed by: NURSE PRACTITIONER

## 2025-03-12 PROCEDURE — 1123F ACP DISCUSS/DSCN MKR DOCD: CPT | Performed by: NURSE PRACTITIONER

## 2025-03-12 ASSESSMENT — ENCOUNTER SYMPTOMS
CARDIOVASCULAR NEGATIVE: 1
RESPIRATORY NEGATIVE: 1
GASTROINTESTINAL NEGATIVE: 1
MUSCULOSKELETAL NEGATIVE: 1
PSYCHIATRIC NEGATIVE: 1
NEUROLOGICAL NEGATIVE: 1
CONSTITUTIONAL NEGATIVE: 1
ENDOCRINE NEGATIVE: 1

## 2025-03-12 ASSESSMENT — PATIENT HEALTH QUESTIONNAIRE - PHQ9
2. FEELING DOWN, DEPRESSED OR HOPELESS: NOT AT ALL
1. LITTLE INTEREST OR PLEASURE IN DOING THINGS: NOT AT ALL
SUM OF ALL RESPONSES TO PHQ9 QUESTIONS 1 AND 2: 0

## 2025-03-12 NOTE — PROGRESS NOTES
Anurag Cummings Jr. male  79 y.o. for long term use of anticoagulation visit.    HPI     Anurag Cummings Jr. is a 79 y.o. here for an INR follow up exam.   Patient has had no chest pain, pressure, palpitations, shortness of breath, or bleeding.      Inr last 2.6   warfarin 7.5mg all days  3.4 today     1/2 dose today or 3.25mg x1 then back to reg sched. He feels well. He will be having some company coming to the area/from [place] he is happy.         Review of Systems   Constitutional: Negative.    HENT: Negative.     Respiratory: Negative.     Cardiovascular: Negative.    Gastrointestinal: Negative.    Endocrine: Negative.    Genitourinary: Negative.    Musculoskeletal: Negative.    Skin: Negative.    Neurological: Negative.    Psychiatric/Behavioral: Negative.         Physical Exam  Vitals and nursing note reviewed.   Constitutional:       Appearance: Normal appearance.   HENT:      Head: Normocephalic.   Eyes:      Pupils: Pupils are equal, round, and reactive to light.   Cardiovascular:      Rate and Rhythm: Normal rate and regular rhythm.      Heart sounds: Normal heart sounds.   Pulmonary:      Effort: Pulmonary effort is normal.      Breath sounds: Normal breath sounds.   Skin:     General: Skin is warm and dry.   Neurological:      General: No focal deficit present.      Mental Status: He is alert and oriented to person, place, and time. Mental status is at baseline.   Psychiatric:         Mood and Affect: Mood normal.         Behavior: Behavior normal.         Thought Content: Thought content normal.         Judgment: Judgment normal.          Visit Vitals  /76   Pulse 72   Temp 36.5 °C (97.7 °F)      Vitals:    03/12/25 1406   Weight: 104 kg (230 lb)        Anticoagulation Episode Summary       Current INR goal:  --   TTR:  --   Next INR check:  --   INR from last check:  3.40 (3/12/2025)   Weekly max warfarin dose:  --   Target end date:  --   INR check location:  --   Preferred lab:  --    Send INR reminders to:  --       Comments:  --                More data may exist     Anticoagulation Monitoring INR INR Date Last Week Total Next Week Total Sun Dudley Hanu   3/12/2025 3.40 3/12/2025 0 mg 0 mg - - - - -   2/12/2025 2.60 2/12/2025 0 mg 0 mg - - - - -   1/10/2025 3.10 1/10/2025 0 mg 0 mg - - - - -   12/12/2024 2.20 12/12/2024 0 mg 0 mg - - - - -   11/12/2024 3.00 11/12/2024 0 mg 0 mg - - - - -   10/15/2024 2.40 10/15/2024 0 mg 0 mg - - - - -   10/4/2024 3.00 10/4/2024 0 mg 0 mg - - - - -   9/11/2024 2.10 9/11/2024 0 mg 0 mg - - - - -   8/1/2024 2.80 8/1/2024 0 mg 0 mg - - - - -   7/18/2024 1.70 7/18/2024 0 mg 0 mg - - - - -   6/20/2024 2.00 6/20/2024 0 mg 0 mg - - - - -   5/24/2024 3.30 5/24/2024 0 mg 0 mg - - - - -   4/30/2024 2.60 4/30/2024 0 mg 0 mg - - - - -   4/22/2024 1.50 4/22/2024 0 mg 0 mg - - - - -   3/26/2024 2.40 3/26/2024 0 mg 0 mg - - - - -   2/23/2024 2.00 2/23/2024 0 mg 0 mg - - - - -   1/24/2024 3.60 1/24/2024 0 mg 0 mg - - - - -   12/21/2023 3.20 12/21/2023 0 mg 0 mg - - - - -   11/21/2023 3.10 11/21/2023 0 mg 0 mg - - - - -   10/31/2023 3.00 10/31/2023 0 mg 0 mg - - - - -       Anticoagulation Monitoring Fri Sat Visit Report   3/12/2025 - - Report   2/12/2025 - - Report   1/10/2025 - - Report   12/12/2024 - - Report   11/12/2024 - - Report   10/15/2024 - - Report   10/4/2024 - - Report   9/11/2024 - - Report   8/1/2024 - - Report   7/18/2024 - - Report   6/20/2024 - - Report   5/24/2024 - - Report   4/30/2024 - - -   4/22/2024 - - Report   3/26/2024 - - Report   2/23/2024 - - Report   1/24/2024 - - Report   12/21/2023 - - Report   11/21/2023 - - Report   10/31/2023 - - Report      Problem List Items Addressed This Visit             ICD-10-CM    History of DVT (deep vein thrombosis) - Primary Z86.718    Relevant Orders    POCT INR manually resulted (Completed)    History of pulmonary embolus (PE) Z86.711    Relevant Orders    POCT INR manually resulted (Completed)    HTN  (hypertension) I10    Relevant Orders    POCT INR manually resulted (Completed)     Other Visit Diagnoses         Codes    Paroxysmal atrial fibrillation (Multi)     I48.0    Relevant Orders    POCT INR manually resulted (Completed)    Anticoagulant long-term use     Z79.01    Relevant Orders    POCT INR manually resulted (Completed)           Laura L Seaver, APRN-CNP

## 2025-04-11 ENCOUNTER — APPOINTMENT (OUTPATIENT)
Dept: PRIMARY CARE | Facility: CLINIC | Age: 80
End: 2025-04-11
Payer: MEDICARE

## 2025-04-11 VITALS
HEIGHT: 71 IN | TEMPERATURE: 97.5 F | RESPIRATION RATE: 16 BRPM | BODY MASS INDEX: 31.92 KG/M2 | DIASTOLIC BLOOD PRESSURE: 90 MMHG | WEIGHT: 228 LBS | HEART RATE: 94 BPM | SYSTOLIC BLOOD PRESSURE: 135 MMHG

## 2025-04-11 DIAGNOSIS — Z79.01 ANTICOAGULANT LONG-TERM USE: ICD-10-CM

## 2025-04-11 DIAGNOSIS — Z86.718 HISTORY OF DVT (DEEP VEIN THROMBOSIS): Primary | ICD-10-CM

## 2025-04-11 DIAGNOSIS — Z86.711 HISTORY OF PULMONARY EMBOLUS (PE): ICD-10-CM

## 2025-04-11 DIAGNOSIS — I48.0 PAROXYSMAL ATRIAL FIBRILLATION (MULTI): ICD-10-CM

## 2025-04-11 DIAGNOSIS — I15.9 SECONDARY HYPERTENSION: ICD-10-CM

## 2025-04-11 LAB
POC INR: 3.2
POC PROTHROMBIN TIME: NORMAL

## 2025-04-11 PROCEDURE — 1123F ACP DISCUSS/DSCN MKR DOCD: CPT | Performed by: NURSE PRACTITIONER

## 2025-04-11 PROCEDURE — 3080F DIAST BP >= 90 MM HG: CPT | Performed by: NURSE PRACTITIONER

## 2025-04-11 PROCEDURE — 85610 PROTHROMBIN TIME: CPT | Performed by: NURSE PRACTITIONER

## 2025-04-11 PROCEDURE — 99214 OFFICE O/P EST MOD 30 MIN: CPT | Performed by: NURSE PRACTITIONER

## 2025-04-11 PROCEDURE — 3075F SYST BP GE 130 - 139MM HG: CPT | Performed by: NURSE PRACTITIONER

## 2025-04-11 ASSESSMENT — ENCOUNTER SYMPTOMS
RESPIRATORY NEGATIVE: 1
PALPITATIONS: 1
GASTROINTESTINAL NEGATIVE: 1
MUSCULOSKELETAL NEGATIVE: 1
NEUROLOGICAL NEGATIVE: 1
PSYCHIATRIC NEGATIVE: 1
CONSTITUTIONAL NEGATIVE: 1
ENDOCRINE NEGATIVE: 1

## 2025-04-11 NOTE — PROGRESS NOTES
Anurag Cummings Jr. male  79 y.o. for long term use of anticoagulation visit.    HPI     Anurag Cummings Jr. is a 79 y.o. here for an INR follow up exam.   Patient has had no chest pain, pressure, palpitations, shortness of breath, or bleeding.      Inr today is 3.2  He will take his normal 7.5mg all days but 1/2 tab tonight  He will be rechecked in 3 weeks    His wife wanted me to know that omid was tired all last week. He has a hx of intermittent afib.  He thinks he has felt a few palpations, but no cp, sob, dizziness, pressure, pain or swelling. Feels like he has to take a deep breath but not sob. He thinks this may have happened as well a few weeks back walking up and down basement stairs 3x doing laundry. As we discussed he is already on an anticoagulant and a beta blocking agent. He may feel afib episodes now and again as long as HR is under 130 and for less than 5min he is to calm/rest/monitor pulse. If longer than that or any other new s/s such as cp, pressure, quentin/vomiting/diaphoresis, jaw pain etc he is to report to the ER over the weekend, he feels none of that now. We will document his afib with an ekg today and he is to make a f/u appt with his cardiology team (dr reyes).                                                                                                                                                                                                                                                                                                                                                                                                                                                                                                                                                                                                                                                                                                                                                                                                                                                                                                                                                                                                                                                                                                                                                                                                                                                                                                                                                                                                                                                                                                                                                                                                                                                                             ..........                                                                        Review of Systems   Constitutional: Negative.    HENT: Negative.     Respiratory: Negative.     Cardiovascular:  Positive for palpitations. Negative for chest pain and leg swelling.   Gastrointestinal: Negative.    Endocrine: Negative.    Genitourinary:  Negative.    Musculoskeletal: Negative.    Skin: Negative.    Neurological: Negative.    Psychiatric/Behavioral: Negative.         Physical Exam  Vitals and nursing note reviewed.   Constitutional:       Appearance: Normal appearance.   HENT:      Head: Normocephalic.   Eyes:      Pupils: Pupils are equal, round, and reactive to light.   Cardiovascular:      Rate and Rhythm: Normal rate. Rhythm irregular.      Heart sounds: Normal heart sounds.   Pulmonary:      Effort: Pulmonary effort is normal.      Breath sounds: Normal breath sounds.   Skin:     General: Skin is warm and dry.   Neurological:      General: No focal deficit present.      Mental Status: He is alert and oriented to person, place, and time. Mental status is at baseline.   Psychiatric:         Mood and Affect: Mood normal.         Behavior: Behavior normal.         Thought Content: Thought content normal.         Judgment: Judgment normal.          Visit Vitals  /90   Pulse 94   Temp 36.4 °C (97.5 °F) (Temporal)   Resp 16      Vitals:    04/11/25 1428   Weight: 103 kg (228 lb)        Anticoagulation Episode Summary       Current INR goal:  --   TTR:  --   Next INR check:  --   INR from last check:  3.20 (4/11/2025)   Weekly max warfarin dose:  --   Target end date:  --   INR check location:  --   Preferred lab:  --   Send INR reminders to:  --       Comments:  --                More data exists     Anticoagulation Monitoring INR INR Date Last Week Total Next Week Total Sun Mon Tue Wed Thu 4/11/2025 3.20 4/11/2025 0 mg 0 mg - - - - -   3/12/2025 3.40 3/12/2025 0 mg 0 mg - - - - -   2/12/2025 2.60 2/12/2025 0 mg 0 mg - - - - -   1/10/2025 3.10 1/10/2025 0 mg 0 mg - - - - -   12/12/2024 2.20 12/12/2024 0 mg 0 mg - - - - -   11/12/2024 3.00 11/12/2024 0 mg 0 mg - - - - -   10/15/2024 2.40 10/15/2024 0 mg 0 mg - - - - -   10/4/2024 3.00 10/4/2024 0 mg 0 mg - - - - -   9/11/2024 2.10 9/11/2024 0 mg 0 mg - - - - -   8/1/2024 2.80 8/1/2024 0 mg 0 mg  - - - - -   7/18/2024 1.70 7/18/2024 0 mg 0 mg - - - - -   6/20/2024 2.00 6/20/2024 0 mg 0 mg - - - - -   5/24/2024 3.30 5/24/2024 0 mg 0 mg - - - - -   4/30/2024 2.60 4/30/2024 0 mg 0 mg - - - - -   4/22/2024 1.50 4/22/2024 0 mg 0 mg - - - - -   3/26/2024 2.40 3/26/2024 0 mg 0 mg - - - - -   2/23/2024 2.00 2/23/2024 0 mg 0 mg - - - - -   1/24/2024 3.60 1/24/2024 0 mg 0 mg - - - - -   12/21/2023 3.20 12/21/2023 0 mg 0 mg - - - - -   11/21/2023 3.10 11/21/2023 0 mg 0 mg - - - - -       Anticoagulation Monitoring Fri Sat Visit Report   4/11/2025 - - Report   3/12/2025 - - Report   2/12/2025 - - Report   1/10/2025 - - Report   12/12/2024 - - Report   11/12/2024 - - Report   10/15/2024 - - Report   10/4/2024 - - Report   9/11/2024 - - Report   8/1/2024 - - Report   7/18/2024 - - Report   6/20/2024 - - Report   5/24/2024 - - Report   4/30/2024 - - -   4/22/2024 - - Report   3/26/2024 - - Report   2/23/2024 - - Report   1/24/2024 - - Report   12/21/2023 - - Report   11/21/2023 - - Report      Problem List Items Addressed This Visit             ICD-10-CM    History of DVT (deep vein thrombosis) - Primary Z86.718    History of pulmonary embolus (PE) Z86.711    HTN (hypertension) I10     Other Visit Diagnoses         Codes    Paroxysmal atrial fibrillation (Multi)     I48.0    Anticoagulant long-term use     Z79.01           Laura L Seaver, APRN-CNP

## 2025-04-19 DIAGNOSIS — E78.5 HYPERLIPIDEMIA, UNSPECIFIED HYPERLIPIDEMIA TYPE: Primary | ICD-10-CM

## 2025-04-21 RX ORDER — ATORVASTATIN CALCIUM 20 MG/1
20 TABLET, FILM COATED ORAL DAILY
Qty: 90 TABLET | Refills: 0 | Status: SHIPPED | OUTPATIENT
Start: 2025-04-21 | End: 2025-07-20

## 2025-05-06 ENCOUNTER — APPOINTMENT (OUTPATIENT)
Dept: PRIMARY CARE | Facility: CLINIC | Age: 80
End: 2025-05-06
Payer: COMMERCIAL

## 2025-05-06 VITALS
HEIGHT: 71 IN | OXYGEN SATURATION: 96 % | DIASTOLIC BLOOD PRESSURE: 74 MMHG | WEIGHT: 224 LBS | SYSTOLIC BLOOD PRESSURE: 135 MMHG | BODY MASS INDEX: 31.36 KG/M2 | HEART RATE: 65 BPM

## 2025-05-06 DIAGNOSIS — D68.9 COAGULATION DEFECT (MULTI): ICD-10-CM

## 2025-05-06 DIAGNOSIS — I48.0 PAROXYSMAL ATRIAL FIBRILLATION (MULTI): ICD-10-CM

## 2025-05-06 DIAGNOSIS — I48.91 ATRIAL FIBRILLATION STATUS POST CARDIOVERSION (MULTI): ICD-10-CM

## 2025-05-06 DIAGNOSIS — E11.65 TYPE 2 DIABETES MELLITUS WITH HYPERGLYCEMIA, WITHOUT LONG-TERM CURRENT USE OF INSULIN: Primary | ICD-10-CM

## 2025-05-06 DIAGNOSIS — I10 HYPERTENSION, UNSPECIFIED TYPE: ICD-10-CM

## 2025-05-06 LAB
POC FINGERSTICK BLOOD GLUCOSE: 86 MG/DL (ref 70–100)
POC HEMOGLOBIN A1C: 6.3 % (ref 4.2–6.5)
POC INR: 2.7 (ref 0.9–1.1)
POC INR: 2.7 (ref 0.9–1.1)
POC PROTHROMBIN TIME: ABNORMAL (ref 9.3–12.5)

## 2025-05-06 PROCEDURE — 3078F DIAST BP <80 MM HG: CPT | Performed by: NURSE PRACTITIONER

## 2025-05-06 PROCEDURE — 3075F SYST BP GE 130 - 139MM HG: CPT | Performed by: NURSE PRACTITIONER

## 2025-05-06 PROCEDURE — 85610 PROTHROMBIN TIME: CPT | Performed by: NURSE PRACTITIONER

## 2025-05-06 PROCEDURE — 82962 GLUCOSE BLOOD TEST: CPT | Performed by: NURSE PRACTITIONER

## 2025-05-06 PROCEDURE — 83036 HEMOGLOBIN GLYCOSYLATED A1C: CPT | Performed by: NURSE PRACTITIONER

## 2025-05-06 PROCEDURE — 99214 OFFICE O/P EST MOD 30 MIN: CPT | Performed by: NURSE PRACTITIONER

## 2025-05-06 PROCEDURE — 1160F RVW MEDS BY RX/DR IN RCRD: CPT | Performed by: NURSE PRACTITIONER

## 2025-05-06 PROCEDURE — 1159F MED LIST DOCD IN RCRD: CPT | Performed by: NURSE PRACTITIONER

## 2025-05-06 RX ORDER — SPIRONOLACTONE 25 MG/1
25 TABLET ORAL DAILY
Qty: 90 TABLET | Refills: 1 | Status: SHIPPED | OUTPATIENT
Start: 2025-05-06 | End: 2025-11-02

## 2025-05-06 RX ORDER — METOPROLOL SUCCINATE 25 MG/1
TABLET, EXTENDED RELEASE ORAL
Qty: 90 TABLET | Refills: 3 | Status: SHIPPED | OUTPATIENT
Start: 2025-05-06 | End: 2025-05-06 | Stop reason: SDUPTHER

## 2025-05-06 RX ORDER — METOPROLOL SUCCINATE 25 MG/1
TABLET, EXTENDED RELEASE ORAL
Qty: 270 TABLET | Refills: 3 | Status: SHIPPED | OUTPATIENT
Start: 2025-05-06

## 2025-05-06 NOTE — PROGRESS NOTES
" Anurag Cummings Jr. is a 79 y.o. here for a diabetic follow up exam.     Pt states they are doing well. Following a low carbohydrate diet and is active.     Aic today is 6.3 doing great there. Stable and under goal. No meds.    Up to date with eye and foot exams, pcp visits.     Dr mcclelland did cardioversion at [facility] - then home - no longer sob or fatigued and he is used to walking 3 miles daily and now back to walking. Doing very well. There was 6 people in the room. Dr reyes arranged the same day cardioversion.  He no longer feels palps. He put him on spirolactone daily as well as on the lasix.       Inr 2.7 last and today 2.7  Now on metoprolol 1 in am, and now 2 pm changed per cardiology. Refills  Flu inr 1mo          Lab Results   Component Value Date    POCGLU 86 05/06/2025    POCGLU 118 (A) 02/12/2025    POCGLU 78 11/12/2024    POCGLU 121 (A) 08/01/2024    POCGLU 84 04/22/2024    HGBA1C 6.3 05/06/2025    HGBA1C 6.2 02/12/2025    HGBA1C 6.1 11/12/2024    HGBA1C 6.2 08/01/2024    HGBA1C 6.1 04/22/2024     /74 (BP Location: Right arm, Patient Position: Sitting, BP Cuff Size: Adult)   Pulse 65   Ht 1.803 m (5' 11\")   Wt 102 kg (224 lb)   SpO2 96%   BMI 31.24 kg/m²    Wt Readings from Last 5 Encounters:   05/06/25 102 kg (224 lb)   04/11/25 103 kg (228 lb)   03/12/25 104 kg (230 lb)   02/12/25 105 kg (232 lb)   01/10/25 102 kg (224 lb)          Lab Results   Component Value Date    ALBUMINUR <7.0 06/06/2024    CREATU 41.1 06/06/2024    MICROALBCREA  06/06/2024      Comment:      One or more analytes used in this calculation is outside of the analytical measurement range. Calculation cannot be performed.        Review of Systems   Constitutional: Negative.    HENT: Negative.     Respiratory: Negative.     Cardiovascular: Negative.    Gastrointestinal: Negative.    Endocrine: Negative.    Genitourinary: Negative.    Musculoskeletal: Negative.    Skin: Negative.    Neurological: Negative.  "   Psychiatric/Behavioral: Negative.          Physical Exam  Vitals and nursing note reviewed.   Constitutional:       Appearance: Normal appearance.   HENT:      Head: Normocephalic.   Eyes:      Pupils: Pupils are equal, round, and reactive to light.   Cardiovascular:      Rate and Rhythm: Normal rate and regular rhythm.      Heart sounds: Normal heart sounds.      Comments: Regular rate and rhythm. No swelling.   Pulmonary:      Effort: Pulmonary effort is normal.      Breath sounds: Normal breath sounds.   Skin:     General: Skin is warm and dry.   Neurological:      General: No focal deficit present.      Mental Status: He is alert and oriented to person, place, and time. Mental status is at baseline.   Psychiatric:         Mood and Affect: Mood normal.         Behavior: Behavior normal.         Thought Content: Thought content normal.         Judgment: Judgment normal.        Problem List Items Addressed This Visit           ICD-10-CM    HTN (hypertension) I10    Relevant Medications    metoprolol succinate XL (Toprol-XL) 25 mg 24 hr tablet     Other Visit Diagnoses         Codes      Type 2 diabetes mellitus with hyperglycemia, without long-term current use of insulin    -  Primary E11.65    Relevant Orders    POCT glycosylated hemoglobin (Hb A1C) manually resulted (Completed)    POCT fingerstick glucose manually resulted (Completed)    POCT INR manually resulted (Completed)      Coagulation defect (Multi)     D68.9    Relevant Orders    POCT INR manually resulted (Completed)    POCT INR manually resulted (Completed)      Paroxysmal atrial fibrillation (Multi)     I48.0    Relevant Medications    spironolactone (Aldactone) 25 mg tablet    metoprolol succinate XL (Toprol-XL) 25 mg 24 hr tablet      Atrial fibrillation status post cardioversion (Multi)     I48.91    Relevant Medications    metoprolol succinate XL (Toprol-XL) 25 mg 24 hr tablet                Laura L Seaver, APRN-CNP

## 2025-05-07 ASSESSMENT — ENCOUNTER SYMPTOMS
GASTROINTESTINAL NEGATIVE: 1
RESPIRATORY NEGATIVE: 1
NEUROLOGICAL NEGATIVE: 1
CONSTITUTIONAL NEGATIVE: 1
PSYCHIATRIC NEGATIVE: 1
CARDIOVASCULAR NEGATIVE: 1
ENDOCRINE NEGATIVE: 1
MUSCULOSKELETAL NEGATIVE: 1

## 2025-06-06 ENCOUNTER — TELEPHONE (OUTPATIENT)
Dept: PRIMARY CARE | Facility: CLINIC | Age: 80
End: 2025-06-06

## 2025-06-06 ENCOUNTER — APPOINTMENT (OUTPATIENT)
Dept: PRIMARY CARE | Facility: CLINIC | Age: 80
End: 2025-06-06
Payer: MEDICARE

## 2025-06-06 VITALS
WEIGHT: 225 LBS | RESPIRATION RATE: 16 BRPM | BODY MASS INDEX: 31.5 KG/M2 | HEART RATE: 92 BPM | DIASTOLIC BLOOD PRESSURE: 77 MMHG | SYSTOLIC BLOOD PRESSURE: 106 MMHG | HEIGHT: 71 IN | TEMPERATURE: 97.7 F

## 2025-06-06 DIAGNOSIS — Z86.711 HISTORY OF PULMONARY EMBOLUS (PE): ICD-10-CM

## 2025-06-06 DIAGNOSIS — E78.5 HYPERLIPIDEMIA, UNSPECIFIED HYPERLIPIDEMIA TYPE: ICD-10-CM

## 2025-06-06 DIAGNOSIS — I48.0 PAROXYSMAL ATRIAL FIBRILLATION (MULTI): ICD-10-CM

## 2025-06-06 DIAGNOSIS — Z79.01 ANTICOAGULANT LONG-TERM USE: ICD-10-CM

## 2025-06-06 DIAGNOSIS — L71.9 ROSACEA: Primary | ICD-10-CM

## 2025-06-06 DIAGNOSIS — I10 HYPERTENSION, UNSPECIFIED TYPE: ICD-10-CM

## 2025-06-06 DIAGNOSIS — D68.9 COAGULATION DEFECT (MULTI): ICD-10-CM

## 2025-06-06 DIAGNOSIS — Z86.718 HISTORY OF DVT (DEEP VEIN THROMBOSIS): ICD-10-CM

## 2025-06-06 LAB
POC INR: 3.4 (ref 0.9–1.1)
POC PROTHROMBIN TIME: ABNORMAL (ref 9.3–12.5)

## 2025-06-06 PROCEDURE — 3074F SYST BP LT 130 MM HG: CPT | Performed by: NURSE PRACTITIONER

## 2025-06-06 PROCEDURE — 1159F MED LIST DOCD IN RCRD: CPT | Performed by: NURSE PRACTITIONER

## 2025-06-06 PROCEDURE — 85610 PROTHROMBIN TIME: CPT | Performed by: NURSE PRACTITIONER

## 2025-06-06 PROCEDURE — 3078F DIAST BP <80 MM HG: CPT | Performed by: NURSE PRACTITIONER

## 2025-06-06 PROCEDURE — 99215 OFFICE O/P EST HI 40 MIN: CPT | Performed by: NURSE PRACTITIONER

## 2025-06-06 PROCEDURE — 1160F RVW MEDS BY RX/DR IN RCRD: CPT | Performed by: NURSE PRACTITIONER

## 2025-06-06 RX ORDER — SPIRONOLACTONE 25 MG/1
25 TABLET ORAL DAILY
Qty: 90 TABLET | Refills: 3 | Status: SHIPPED | OUTPATIENT
Start: 2025-06-06 | End: 2025-12-03

## 2025-06-06 RX ORDER — ATORVASTATIN CALCIUM 20 MG/1
20 TABLET, FILM COATED ORAL DAILY
Qty: 90 TABLET | Refills: 3 | Status: SHIPPED | OUTPATIENT
Start: 2025-06-06 | End: 2025-09-04

## 2025-06-06 RX ORDER — TIZANIDINE 4 MG/1
TABLET ORAL
COMMUNITY
Start: 2025-05-07

## 2025-06-06 RX ORDER — DOXYCYCLINE HYCLATE 20 MG
TABLET ORAL
Qty: 360 TABLET | Refills: 1 | Status: SHIPPED | OUTPATIENT
Start: 2025-06-06

## 2025-06-06 ASSESSMENT — ENCOUNTER SYMPTOMS
GASTROINTESTINAL NEGATIVE: 1
NEUROLOGICAL NEGATIVE: 1
PSYCHIATRIC NEGATIVE: 1
RESPIRATORY NEGATIVE: 1
ENDOCRINE NEGATIVE: 1
CARDIOVASCULAR NEGATIVE: 1
MUSCULOSKELETAL NEGATIVE: 1
CONSTITUTIONAL NEGATIVE: 1

## 2025-06-06 NOTE — TELEPHONE ENCOUNTER
He has had fatigue and his ekg shows today he is back in afib. On the meds, but needs to f/u with cardiology.  Please fax todays ekg to dr reyes and electro specialist Janeller. All in his chart. Please call and let  at dr reyes office to send dr reyes a message to check the faxed ekg and inform pt of next plan. HR was 92. Vitals fine. Only pt c/o is more fatigue again. Tysm - lsnp

## 2025-06-06 NOTE — TELEPHONE ENCOUNTER
Spoke to Dr Capps / [facility] office and informed of above. Fax EKG to [phone]  Pt has cardioversion sched for July 25

## 2025-06-06 NOTE — PROGRESS NOTES
Anurag Cummings Jr. male  79 y.o. for long term use of anticoagulation visit.    HPI     Anurag Cummings Jr. is a 79 y.o. here for an INR follow up exam.   Patient has had no chest pain, pressure, palpitations, shortness of breath, or bleeding.  Stable wt. Post cardioversion for afib see notes. Has had cardiology f/u. Dr Kirby    Inr today is 3.4 (2.7, 3.2)  -   Warfarin 7.5mg all days     hold today x 1 only dose then back to 7.5mg daily     Unfort, he has been more tired again. His wife said to mention it to me. His ekg shows he is back in afib. Wt stable. He has no other s/s. No edema, sob, cp, palps, n/v. Just tired. Vss. Pulse 92. On safe meds/covered. We will contact dr reyes office re: next steps.    Addendum: pt has another cardioversion sched for 7/25 per our office staff/arranged with dr reyes office after notification.     Review of Systems   Constitutional: Negative.    HENT: Negative.     Respiratory: Negative.     Cardiovascular: Negative.    Gastrointestinal: Negative.    Endocrine: Negative.    Genitourinary: Negative.    Musculoskeletal: Negative.    Skin: Negative.    Neurological: Negative.    Psychiatric/Behavioral: Negative.         Physical Exam  Vitals and nursing note reviewed.   Constitutional:       Appearance: Normal appearance.   HENT:      Head: Normocephalic.   Eyes:      Pupils: Pupils are equal, round, and reactive to light.   Cardiovascular:      Rate and Rhythm: Normal rate. Rhythm irregular.      Heart sounds: Normal heart sounds.   Pulmonary:      Effort: Pulmonary effort is normal.      Breath sounds: Normal breath sounds.   Skin:     General: Skin is warm and dry.   Neurological:      General: No focal deficit present.      Mental Status: He is alert and oriented to person, place, and time. Mental status is at baseline.   Psychiatric:         Mood and Affect: Mood normal.         Behavior: Behavior normal.         Thought Content: Thought content normal.         Judgment:  Judgment normal.          Visit Vitals  /77   Pulse 92   Temp 36.5 °C (97.7 °F) (Temporal)   Resp 16      Vitals:    06/06/25 1259   Weight: 102 kg (225 lb)        Anticoagulation Episode Summary       Current INR goal:  --   TTR:  --   Next INR check:  --   INR from last check:  3.40 (6/6/2025)   Weekly max warfarin dose:  --   Target end date:  --   INR check location:  --   Preferred lab:  --   Send INR reminders to:  --       Comments:  --                More data exists     Anticoagulation Monitoring INR INR Date Last Week Total Next Week Total Sun Mon Tue Wed Thu   6/6/2025 3.40 6/6/2025 0 mg 0 mg - - - - -   5/6/2025 2.70 5/6/2025 0 mg 0 mg - - - - -   4/11/2025 3.20 4/11/2025 0 mg 0 mg - - - - -   3/12/2025 3.40 3/12/2025 0 mg 0 mg - - - - -   2/12/2025 2.60 2/12/2025 0 mg 0 mg - - - - -   1/10/2025 3.10 1/10/2025 0 mg 0 mg - - - - -   12/12/2024 2.20 12/12/2024 0 mg 0 mg - - - - -   11/12/2024 3.00 11/12/2024 0 mg 0 mg - - - - -   10/15/2024 2.40 10/15/2024 0 mg 0 mg - - - - -   10/4/2024 3.00 10/4/2024 0 mg 0 mg - - - - -   9/11/2024 2.10 9/11/2024 0 mg 0 mg - - - - -   8/1/2024 2.80 8/1/2024 0 mg 0 mg - - - - -   7/18/2024 1.70 7/18/2024 0 mg 0 mg - - - - -   6/20/2024 2.00 6/20/2024 0 mg 0 mg - - - - -   5/24/2024 3.30 5/24/2024 0 mg 0 mg - - - - -   4/30/2024 2.60 4/30/2024 0 mg 0 mg - - - - -   4/22/2024 1.50 4/22/2024 0 mg 0 mg - - - - -   3/26/2024 2.40 3/26/2024 0 mg 0 mg - - - - -   2/23/2024 2.00 2/23/2024 0 mg 0 mg - - - - -   1/24/2024 3.60 1/24/2024 0 mg 0 mg - - - - -       Anticoagulation Monitoring Fri Sat Visit Report   6/6/2025 - - Report   5/6/2025 - - Report   4/11/2025 - - Report   3/12/2025 - - Report   2/12/2025 - - Report   1/10/2025 - - Report   12/12/2024 - - Report   11/12/2024 - - Report   10/15/2024 - - Report   10/4/2024 - - Report   9/11/2024 - - Report   8/1/2024 - - Report   7/18/2024 - - Report   6/20/2024 - - Report   5/24/2024 - - Report   4/30/2024 - - -    4/22/2024 - - Report   3/26/2024 - - Report   2/23/2024 - - Report   1/24/2024 - - Report        Assessment & Plan  Hyperlipidemia, unspecified hyperlipidemia type         Paroxysmal atrial fibrillation (Multi)  Unfort sounds like recurrent afib. We will get with his cardiology office now.       Rosacea  Doxy continues (derm)       Coagulation defect (Multi)         Hypertension, unspecified type         History of pulmonary embolus (PE)         Anticoagulant long-term use         History of DVT (deep vein thrombosis)              Laura L Seaver, DARIA-CNP    I spent a total of documented minutes on the date of service which included preparing to see the patient, face-to-face patient care, completing clinical documentation. Obtained and/or reviewed separately obtained history, counseling and education the patient/family/caregiver, and ordering medications, refills, tests, procedure or consults to specialists.

## 2025-06-20 ENCOUNTER — APPOINTMENT (OUTPATIENT)
Dept: PRIMARY CARE | Facility: CLINIC | Age: 80
End: 2025-06-20
Payer: COMMERCIAL

## 2025-06-20 VITALS
BODY MASS INDEX: 31.08 KG/M2 | TEMPERATURE: 97 F | RESPIRATION RATE: 16 BRPM | SYSTOLIC BLOOD PRESSURE: 115 MMHG | DIASTOLIC BLOOD PRESSURE: 76 MMHG | HEIGHT: 71 IN | HEART RATE: 73 BPM | WEIGHT: 222 LBS

## 2025-06-20 DIAGNOSIS — D68.9 COAGULATION DEFECT (MULTI): ICD-10-CM

## 2025-06-20 DIAGNOSIS — Z86.711 HISTORY OF PULMONARY EMBOLUS (PE): ICD-10-CM

## 2025-06-20 DIAGNOSIS — Z79.01 ANTICOAGULANT LONG-TERM USE: ICD-10-CM

## 2025-06-20 DIAGNOSIS — I48.0 PAROXYSMAL ATRIAL FIBRILLATION (MULTI): Primary | ICD-10-CM

## 2025-06-20 DIAGNOSIS — Z86.718 HISTORY OF DVT (DEEP VEIN THROMBOSIS): ICD-10-CM

## 2025-06-20 DIAGNOSIS — I10 HYPERTENSION, UNSPECIFIED TYPE: ICD-10-CM

## 2025-06-20 LAB
POC INR: 3.6 (ref 0.9–1.1)
POC PROTHROMBIN TIME: ABNORMAL (ref 9.3–12.5)

## 2025-06-20 PROCEDURE — 3074F SYST BP LT 130 MM HG: CPT | Performed by: NURSE PRACTITIONER

## 2025-06-20 PROCEDURE — 1160F RVW MEDS BY RX/DR IN RCRD: CPT | Performed by: NURSE PRACTITIONER

## 2025-06-20 PROCEDURE — 85610 PROTHROMBIN TIME: CPT | Performed by: NURSE PRACTITIONER

## 2025-06-20 PROCEDURE — 3078F DIAST BP <80 MM HG: CPT | Performed by: NURSE PRACTITIONER

## 2025-06-20 PROCEDURE — 1159F MED LIST DOCD IN RCRD: CPT | Performed by: NURSE PRACTITIONER

## 2025-06-20 PROCEDURE — 99213 OFFICE O/P EST LOW 20 MIN: CPT | Performed by: NURSE PRACTITIONER

## 2025-06-20 ASSESSMENT — ENCOUNTER SYMPTOMS
GASTROINTESTINAL NEGATIVE: 1
CARDIOVASCULAR NEGATIVE: 1
MUSCULOSKELETAL NEGATIVE: 1
PSYCHIATRIC NEGATIVE: 1
PALPITATIONS: 0
SHORTNESS OF BREATH: 0
ENDOCRINE NEGATIVE: 1
NEUROLOGICAL NEGATIVE: 1
CHEST TIGHTNESS: 0
CONSTITUTIONAL NEGATIVE: 1
RESPIRATORY NEGATIVE: 1

## 2025-06-20 NOTE — PROGRESS NOTES
Anurag Cummings Jr. male  79 y.o. for long term use of anticoagulation visit.    HPI     Anurag Cummings Jr. is a 79 y.o. here for an INR follow up exam.   Patient has had no chest pain, pressure, palpitations, shortness of breath, or bleeding.      Inr 3.6  1/2 dose warfarin next dose  Back to schedule 7.5mg daily   He had been on amox before his dental removal, eating different as well  He was already addressed by cardiology to do a 3 day inpt study d/t his reoccurance of afib post cardioversion  F/u inr one week MA check then 3 week with me     Review of Systems   Constitutional: Negative.    HENT: Negative.     Respiratory: Negative.  Negative for chest tightness and shortness of breath.    Cardiovascular: Negative.  Negative for chest pain, palpitations and leg swelling.        Fatigue again   Gastrointestinal: Negative.    Endocrine: Negative.    Genitourinary: Negative.    Musculoskeletal: Negative.    Skin: Negative.    Neurological: Negative.    Psychiatric/Behavioral: Negative.         Physical Exam  Vitals and nursing note reviewed.   Constitutional:       Appearance: Normal appearance.   HENT:      Head: Normocephalic.   Eyes:      Pupils: Pupils are equal, round, and reactive to light.   Cardiovascular:      Rate and Rhythm: Normal rate. Rhythm irregular.      Heart sounds: Normal heart sounds.   Pulmonary:      Effort: Pulmonary effort is normal.      Breath sounds: Normal breath sounds.   Skin:     General: Skin is warm and dry.   Neurological:      General: No focal deficit present.      Mental Status: He is alert and oriented to person, place, and time. Mental status is at baseline.   Psychiatric:         Mood and Affect: Mood normal.         Behavior: Behavior normal.         Thought Content: Thought content normal.         Judgment: Judgment normal.      Visit Vitals  /76   Pulse 73   Temp 36.1 °C (97 °F) (Temporal)   Resp 16      Vitals:    06/20/25 1129   Weight: 101 kg (222 lb)         Anticoagulation Episode Summary       Current INR goal:  --   TTR:  --   Next INR check:  --   INR from last check:  3.60 (6/20/2025)   Weekly max warfarin dose:  --   Target end date:  --   INR check location:  --   Preferred lab:  --   Send INR reminders to:  --       Comments:  --                More data exists     Anticoagulation Monitoring INR INR Date Last Week Total Next Week Total Sun Dudley Alvarez Wed Thu   6/20/2025 3.60 6/20/2025 0 mg 0 mg - - - - -   6/6/2025 3.40 6/6/2025 0 mg 0 mg - - - - -   5/6/2025 2.70 5/6/2025 0 mg 0 mg - - - - -   4/11/2025 3.20 4/11/2025 0 mg 0 mg - - - - -   3/12/2025 3.40 3/12/2025 0 mg 0 mg - - - - -   2/12/2025 2.60 2/12/2025 0 mg 0 mg - - - - -   1/10/2025 3.10 1/10/2025 0 mg 0 mg - - - - -   12/12/2024 2.20 12/12/2024 0 mg 0 mg - - - - -   11/12/2024 3.00 11/12/2024 0 mg 0 mg - - - - -   10/15/2024 2.40 10/15/2024 0 mg 0 mg - - - - -   10/4/2024 3.00 10/4/2024 0 mg 0 mg - - - - -   9/11/2024 2.10 9/11/2024 0 mg 0 mg - - - - -   8/1/2024 2.80 8/1/2024 0 mg 0 mg - - - - -   7/18/2024 1.70 7/18/2024 0 mg 0 mg - - - - -   6/20/2024 2.00 6/20/2024 0 mg 0 mg - - - - -   5/24/2024 3.30 5/24/2024 0 mg 0 mg - - - - -   4/30/2024 2.60 4/30/2024 0 mg 0 mg - - - - -   4/22/2024 1.50 4/22/2024 0 mg 0 mg - - - - -   3/26/2024 2.40 3/26/2024 0 mg 0 mg - - - - -   2/23/2024 2.00 2/23/2024 0 mg 0 mg - - - - -       Anticoagulation Monitoring Fri Sat Visit Report   6/20/2025 - - Report   6/6/2025 - - Report   5/6/2025 - - Report   4/11/2025 - - Report   3/12/2025 - - Report   2/12/2025 - - Report   1/10/2025 - - Report   12/12/2024 - - Report   11/12/2024 - - Report   10/15/2024 - - Report   10/4/2024 - - Report   9/11/2024 - - Report   8/1/2024 - - Report   7/18/2024 - - Report   6/20/2024 - - Report   5/24/2024 - - Report   4/30/2024 - - -   4/22/2024 - - Report   3/26/2024 - - Report   2/23/2024 - - Report        Assessment & Plan  Paroxysmal atrial fibrillation (Multi)          Coagulation defect (Multi)         Hypertension, unspecified type         History of pulmonary embolus (PE)         History of DVT (deep vein thrombosis)         Anticoagulant long-term use              Laura L Seaver, APRN-CNP    I spent a total of documented minutes on the date of service which included preparing to see the patient, face-to-face patient care, completing clinical documentation. Obtained and/or reviewed separately obtained history, counseling and education the patient/family/caregiver, and ordering medications, refills, tests, procedure or consults to specialists.

## 2025-07-01 ENCOUNTER — APPOINTMENT (OUTPATIENT)
Dept: PRIMARY CARE | Facility: CLINIC | Age: 80
End: 2025-07-01
Payer: MEDICARE

## 2025-07-01 DIAGNOSIS — Z79.01 ANTICOAGULANT LONG-TERM USE: ICD-10-CM

## 2025-07-01 LAB
POC INR: 3.7 (ref 0.9–1.1)
POC PROTHROMBIN TIME: ABNORMAL (ref 9.3–12.5)

## 2025-07-01 PROCEDURE — 85610 PROTHROMBIN TIME: CPT | Performed by: FAMILY MEDICINE

## 2025-07-03 ENCOUNTER — TELEPHONE (OUTPATIENT)
Dept: PRIMARY CARE | Facility: CLINIC | Age: 80
End: 2025-07-03
Payer: MEDICARE

## 2025-07-03 NOTE — TELEPHONE ENCOUNTER
----- Message from Kimi Lee sent at 7/3/2025 12:20 PM EDT -----  Hold one dose then restart reg dose of coumadin. Recheck inr next week with Juany  ----- Message -----  From: Pratima Chapin MA  Sent: 7/2/2025   8:24 AM EDT  To: Kimi Lee MD    Not handled   Juany is off all week   ----- Message -----  From: Kimi Lee MD  Sent: 7/2/2025   7:36 AM EDT  To: Pratima Chapin MA    Was this handled? Not sure if Juany was here?   ----- Message -----  From: Pratima Chapin MA  Sent: 7/1/2025  12:53 PM EDT  To: Kimi Lee MD

## 2025-07-15 ENCOUNTER — APPOINTMENT (OUTPATIENT)
Dept: PRIMARY CARE | Facility: CLINIC | Age: 80
End: 2025-07-15
Payer: COMMERCIAL

## 2025-07-23 ENCOUNTER — APPOINTMENT (OUTPATIENT)
Dept: PRIMARY CARE | Facility: CLINIC | Age: 80
End: 2025-07-23
Payer: COMMERCIAL

## 2025-07-23 VITALS
SYSTOLIC BLOOD PRESSURE: 109 MMHG | BODY MASS INDEX: 31.36 KG/M2 | DIASTOLIC BLOOD PRESSURE: 78 MMHG | HEIGHT: 71 IN | RESPIRATION RATE: 16 BRPM | HEART RATE: 85 BPM | WEIGHT: 224 LBS

## 2025-07-23 DIAGNOSIS — Z79.01 ANTICOAGULANT LONG-TERM USE: ICD-10-CM

## 2025-07-23 DIAGNOSIS — Z86.711 HISTORY OF PULMONARY EMBOLUS (PE): ICD-10-CM

## 2025-07-23 DIAGNOSIS — D68.9 COAGULATION DEFECT (MULTI): ICD-10-CM

## 2025-07-23 DIAGNOSIS — Z86.718 HISTORY OF DVT (DEEP VEIN THROMBOSIS): ICD-10-CM

## 2025-07-23 DIAGNOSIS — I10 HYPERTENSION, UNSPECIFIED TYPE: ICD-10-CM

## 2025-07-23 DIAGNOSIS — I48.0 PAROXYSMAL ATRIAL FIBRILLATION (MULTI): Primary | ICD-10-CM

## 2025-07-23 LAB
POC INR: 4.1 (ref 0.9–1.1)
POC PROTHROMBIN TIME: ABNORMAL (ref 9.3–12.5)

## 2025-07-23 PROCEDURE — 99213 OFFICE O/P EST LOW 20 MIN: CPT | Performed by: NURSE PRACTITIONER

## 2025-07-23 PROCEDURE — 85610 PROTHROMBIN TIME: CPT | Performed by: NURSE PRACTITIONER

## 2025-07-23 PROCEDURE — 3074F SYST BP LT 130 MM HG: CPT | Performed by: NURSE PRACTITIONER

## 2025-07-23 PROCEDURE — 1160F RVW MEDS BY RX/DR IN RCRD: CPT | Performed by: NURSE PRACTITIONER

## 2025-07-23 PROCEDURE — 3078F DIAST BP <80 MM HG: CPT | Performed by: NURSE PRACTITIONER

## 2025-07-23 PROCEDURE — 1159F MED LIST DOCD IN RCRD: CPT | Performed by: NURSE PRACTITIONER

## 2025-07-23 RX ORDER — WARFARIN SODIUM 5 MG/1
TABLET ORAL
Qty: 120 TABLET | Refills: 3 | Status: SHIPPED | OUTPATIENT
Start: 2025-07-23

## 2025-07-23 ASSESSMENT — ENCOUNTER SYMPTOMS
RESPIRATORY NEGATIVE: 1
ENDOCRINE NEGATIVE: 1
CARDIOVASCULAR NEGATIVE: 1
FATIGUE: 1
PSYCHIATRIC NEGATIVE: 1
NEUROLOGICAL NEGATIVE: 1
MUSCULOSKELETAL NEGATIVE: 1
GASTROINTESTINAL NEGATIVE: 1

## 2025-07-23 NOTE — PROGRESS NOTES
Anurag Cummings Jr. male  79 y.o. for long term use of anticoagulation visit.    HPI     Anurag Cummings Jr. is a 79 y.o. here for an INR follow up exam.   Patient has had no chest pain, pressure, palpitations, shortness of breath, or bleeding.      Had cardioversion. Did well. Saw cardio. Then 3 days later wife noted more fatigue again and reverted noted back to afib on a f/u inr check. Then back to cardio for another eval. They postponed his admission he is unsure why. He feels ok no cardiac s/s just fatigue. They spoke of future ablation possible, but first he will be admitted for the 3 day inpt watch for cardiology to attempt other orals it sounds like, and a possible 2nd cardioversion. Admission august 25, 26, 27 at [facility]       Last inr 3.7 (3.6, 3.4) - 4.1 today  Warfarin  7.5mg weekly  Last inr at 3.7 was handled by pcp dr logan (i was out of office). Told to hold one dose then back to 7.5mg weekly. He is here to check on this today.   Plan:  Hold next dose. Greens. Will take 2.5 mg on sundays then rest 7.5mg   F/u 2 weeks            Review of Systems   Constitutional:  Positive for fatigue.   HENT: Negative.     Respiratory: Negative.     Cardiovascular: Negative.    Gastrointestinal: Negative.    Endocrine: Negative.    Genitourinary: Negative.    Musculoskeletal: Negative.    Skin: Negative.    Neurological: Negative.    Psychiatric/Behavioral: Negative.         Physical Exam  Vitals and nursing note reviewed.   Constitutional:       Appearance: Normal appearance.   HENT:      Head: Normocephalic.     Eyes:      Pupils: Pupils are equal, round, and reactive to light.       Cardiovascular:      Rate and Rhythm: Normal rate. Rhythm irregular.      Heart sounds: Normal heart sounds.   Pulmonary:      Effort: Pulmonary effort is normal.      Breath sounds: Normal breath sounds.     Skin:     General: Skin is warm and dry.     Neurological:      General: No focal deficit present.      Mental Status: He  is alert and oriented to person, place, and time. Mental status is at baseline.     Psychiatric:         Mood and Affect: Mood normal.         Behavior: Behavior normal.         Thought Content: Thought content normal.         Judgment: Judgment normal.          Visit Vitals  /78   Pulse 85   Resp 16      Vitals:    07/23/25 1320   Weight: 102 kg (224 lb)        Anticoagulation Episode Summary       Current INR goal:  --   TTR:  --   Next INR check:  --   INR from last check:  4.10 (7/23/2025)   Weekly max warfarin dose:  --   Target end date:  --   INR check location:  --   Preferred lab:  --   Send INR reminders to:  --       Comments:  --                More data exists     Anticoagulation Monitoring INR INR Date Last Week Total Next Week Total Sun Mon Tue Wed Thu 7/23/2025 4.10 7/23/2025 0 mg 0 mg - - - - -   7/1/2025 3.70 7/1/2025 0 mg 0 mg - - - - -   6/20/2025 3.60 6/20/2025 0 mg 0 mg - - - - -   6/6/2025 3.40 6/6/2025 0 mg 0 mg - - - - -   5/6/2025 2.70 5/6/2025 0 mg 0 mg - - - - -   4/11/2025 3.20 4/11/2025 0 mg 0 mg - - - - -   3/12/2025 3.40 3/12/2025 0 mg 0 mg - - - - -   2/12/2025 2.60 2/12/2025 0 mg 0 mg - - - - -   1/10/2025 3.10 1/10/2025 0 mg 0 mg - - - - -   12/12/2024 2.20 12/12/2024 0 mg 0 mg - - - - -   11/12/2024 3.00 11/12/2024 0 mg 0 mg - - - - -   10/15/2024 2.40 10/15/2024 0 mg 0 mg - - - - -   10/4/2024 3.00 10/4/2024 0 mg 0 mg - - - - -   9/11/2024 2.10 9/11/2024 0 mg 0 mg - - - - -   8/1/2024 2.80 8/1/2024 0 mg 0 mg - - - - -   7/18/2024 1.70 7/18/2024 0 mg 0 mg - - - - -   6/20/2024 2.00 6/20/2024 0 mg 0 mg - - - - -   5/24/2024 3.30 5/24/2024 0 mg 0 mg - - - - -   4/30/2024 2.60 4/30/2024 0 mg 0 mg - - - - -   4/22/2024 1.50 4/22/2024 0 mg 0 mg - - - - -       Anticoagulation Monitoring Fri Sat Visit Report   7/23/2025 - - Report   7/1/2025 - - -   6/20/2025 - - Report   6/6/2025 - - Report   5/6/2025 - - Report   4/11/2025 - - Report   3/12/2025 - - Report   2/12/2025 - -  Report   1/10/2025 - - Report   12/12/2024 - - Report   11/12/2024 - - Report   10/15/2024 - - Report   10/4/2024 - - Report   9/11/2024 - - Report   8/1/2024 - - Report   7/18/2024 - - Report   6/20/2024 - - Report   5/24/2024 - - Report   4/30/2024 - - -   4/22/2024 - - Report        Assessment & Plan  Paroxysmal atrial fibrillation (Multi)         Anticoagulant long-term use    Orders:    warfarin (Coumadin) 5 mg tablet; TAKE 1.5 TABLETS = 7.5mg  DAILY IN THE EVENING    Coagulation defect (Multi)         Hypertension, unspecified type         History of pulmonary embolus (PE)         History of DVT (deep vein thrombosis)              Laura L Seaver, APRN-CNP    I spent a total of documented minutes on the date of service which included preparing to see the patient, face-to-face patient care, completing clinical documentation. Obtained and/or reviewed separately obtained history, counseling and education the patient/family/caregiver, and ordering medications, refills, tests, procedure or consults to specialists.

## 2025-08-06 ENCOUNTER — APPOINTMENT (OUTPATIENT)
Dept: PRIMARY CARE | Facility: CLINIC | Age: 80
End: 2025-08-06
Payer: MEDICARE

## 2025-08-06 VITALS
DIASTOLIC BLOOD PRESSURE: 81 MMHG | HEART RATE: 94 BPM | BODY MASS INDEX: 31.64 KG/M2 | RESPIRATION RATE: 16 BRPM | TEMPERATURE: 97.9 F | HEIGHT: 71 IN | WEIGHT: 226 LBS | SYSTOLIC BLOOD PRESSURE: 117 MMHG

## 2025-08-06 DIAGNOSIS — Z86.711 HISTORY OF PULMONARY EMBOLUS (PE): ICD-10-CM

## 2025-08-06 DIAGNOSIS — I10 HYPERTENSION, UNSPECIFIED TYPE: ICD-10-CM

## 2025-08-06 DIAGNOSIS — Z79.01 ANTICOAGULANT LONG-TERM USE: ICD-10-CM

## 2025-08-06 DIAGNOSIS — E11.65 TYPE 2 DIABETES MELLITUS WITH HYPERGLYCEMIA, WITHOUT LONG-TERM CURRENT USE OF INSULIN: Primary | ICD-10-CM

## 2025-08-06 DIAGNOSIS — D68.9 COAGULATION DEFECT (MULTI): ICD-10-CM

## 2025-08-06 DIAGNOSIS — Z86.718 HISTORY OF DVT (DEEP VEIN THROMBOSIS): ICD-10-CM

## 2025-08-06 DIAGNOSIS — I48.0 PAROXYSMAL ATRIAL FIBRILLATION (MULTI): ICD-10-CM

## 2025-08-06 LAB
POC FINGERSTICK BLOOD GLUCOSE: 90 MG/DL (ref 70–100)
POC HEMOGLOBIN A1C: 6.5 % (ref 4.2–6.5)
POC INR: 3.7 (ref 0.9–1.1)
POC PROTHROMBIN TIME: ABNORMAL (ref 9.3–12.5)

## 2025-08-06 PROCEDURE — 3079F DIAST BP 80-89 MM HG: CPT | Performed by: NURSE PRACTITIONER

## 2025-08-06 PROCEDURE — 82962 GLUCOSE BLOOD TEST: CPT | Performed by: NURSE PRACTITIONER

## 2025-08-06 PROCEDURE — 99214 OFFICE O/P EST MOD 30 MIN: CPT | Performed by: NURSE PRACTITIONER

## 2025-08-06 PROCEDURE — 3074F SYST BP LT 130 MM HG: CPT | Performed by: NURSE PRACTITIONER

## 2025-08-06 PROCEDURE — 1159F MED LIST DOCD IN RCRD: CPT | Performed by: NURSE PRACTITIONER

## 2025-08-06 PROCEDURE — 1160F RVW MEDS BY RX/DR IN RCRD: CPT | Performed by: NURSE PRACTITIONER

## 2025-08-06 PROCEDURE — 85610 PROTHROMBIN TIME: CPT | Performed by: NURSE PRACTITIONER

## 2025-08-06 PROCEDURE — 83036 HEMOGLOBIN GLYCOSYLATED A1C: CPT | Performed by: NURSE PRACTITIONER

## 2025-08-06 ASSESSMENT — ENCOUNTER SYMPTOMS
CARDIOVASCULAR NEGATIVE: 1
GASTROINTESTINAL NEGATIVE: 1
NEUROLOGICAL NEGATIVE: 1
FATIGUE: 1
RESPIRATORY NEGATIVE: 1
ENDOCRINE NEGATIVE: 1
PSYCHIATRIC NEGATIVE: 1
MUSCULOSKELETAL NEGATIVE: 1

## 2025-08-06 NOTE — PROGRESS NOTES
Anurag Cummings Jr. male  79 y.o. for long term use of anticoagulation visit.    Diabetes  Associated symptoms include fatigue.        Anurag Cummings Jr. is a 79 y.o. here for an INR follow up exam.   Patient has had no chest pain, pressure, palpitations, shortness of breath, or bleeding.    Inr today is at 3.7 (4.10)  ? Elevated - Started on another diuretic for cards  An inpt stay is planned [facility] end of august as he was cardioverted but unfor he went back into afib  He cannot feel it, but fatigue is an issue.   Warfarin dosing 7.5mg all days but on sunday takes 2.5  We will go to 2.5mg on all wed (includes today) and sundays rest days 7.5mg  He is also due for h+p with pcp will sched today    Also d/f his aic. 6.5% today last 6.3% no meds, he is up to date on eye checks, and he checks his own feet. Urine micro due today.         Review of Systems   Constitutional:  Positive for fatigue.   HENT: Negative.     Respiratory: Negative.     Cardiovascular: Negative.    Gastrointestinal: Negative.    Endocrine: Negative.    Genitourinary: Negative.    Musculoskeletal: Negative.    Skin: Negative.    Neurological: Negative.    Psychiatric/Behavioral: Negative.         Physical Exam  Vitals and nursing note reviewed.   Constitutional:       Appearance: Normal appearance.   HENT:      Head: Normocephalic.     Eyes:      Pupils: Pupils are equal, round, and reactive to light.       Cardiovascular:      Rate and Rhythm: Normal rate. Rhythm irregular.      Heart sounds: Normal heart sounds.   Pulmonary:      Effort: Pulmonary effort is normal.      Breath sounds: Normal breath sounds.     Skin:     General: Skin is warm and dry.     Neurological:      General: No focal deficit present.      Mental Status: He is alert and oriented to person, place, and time. Mental status is at baseline.     Psychiatric:         Mood and Affect: Mood normal.         Behavior: Behavior normal.         Thought Content: Thought  content normal.         Judgment: Judgment normal.          Visit Vitals  /81   Pulse 94   Temp 36.6 °C (97.9 °F) (Temporal)   Resp 16      Vitals:    08/06/25 1427   Weight: 103 kg (226 lb)        Anticoagulation Episode Summary       Current INR goal:  --   TTR:  --   Next INR check:  --   INR from last check:  3.70 (8/6/2025)   Weekly max warfarin dose:  --   Target end date:  --   INR check location:  --   Preferred lab:  --   Send INR reminders to:  --       Comments:  --                More data exists     Anticoagulation Monitoring INR INR Date Last Week Total Next Week Total Sun Mon Tue Wed Thu   8/6/2025 3.70 8/6/2025 0 mg 0 mg - - - - -   7/23/2025 4.10 7/23/2025 0 mg 0 mg - - - - -   7/1/2025 3.70 7/1/2025 0 mg 0 mg - - - - -   6/20/2025 3.60 6/20/2025 0 mg 0 mg - - - - -   6/6/2025 3.40 6/6/2025 0 mg 0 mg - - - - -   5/6/2025 2.70 5/6/2025 0 mg 0 mg - - - - -   4/11/2025 3.20 4/11/2025 0 mg 0 mg - - - - -   3/12/2025 3.40 3/12/2025 0 mg 0 mg - - - - -   2/12/2025 2.60 2/12/2025 0 mg 0 mg - - - - -   1/10/2025 3.10 1/10/2025 0 mg 0 mg - - - - -   12/12/2024 2.20 12/12/2024 0 mg 0 mg - - - - -   11/12/2024 3.00 11/12/2024 0 mg 0 mg - - - - -   10/15/2024 2.40 10/15/2024 0 mg 0 mg - - - - -   10/4/2024 3.00 10/4/2024 0 mg 0 mg - - - - -   9/11/2024 2.10 9/11/2024 0 mg 0 mg - - - - -   8/1/2024 2.80 8/1/2024 0 mg 0 mg - - - - -   7/18/2024 1.70 7/18/2024 0 mg 0 mg - - - - -   6/20/2024 2.00 6/20/2024 0 mg 0 mg - - - - -   5/24/2024 3.30 5/24/2024 0 mg 0 mg - - - - -   4/30/2024 2.60 4/30/2024 0 mg 0 mg - - - - -       Anticoagulation Monitoring Fri Sat Visit Report   8/6/2025 - - Report   7/23/2025 - - Report   7/1/2025 - - -   6/20/2025 - - Report   6/6/2025 - - Report   5/6/2025 - - Report   4/11/2025 - - Report   3/12/2025 - - Report   2/12/2025 - - Report   1/10/2025 - - Report   12/12/2024 - - Report   11/12/2024 - - Report   10/15/2024 - - Report   10/4/2024 - - Report   9/11/2024 - - Report    8/1/2024 - - Report   7/18/2024 - - Report   6/20/2024 - - Report   5/24/2024 - - Report   4/30/2024 - - -        Assessment & Plan  Type 2 diabetes mellitus with hyperglycemia, without long-term current use of insulin    Orders:    POCT glycosylated hemoglobin (Hb A1C) manually resulted    POCT fingerstick glucose manually resulted    POCT INR manually resulted    Albumin-Creatinine Ratio, Urine Random    Coagulation defect (Multi)    Orders:    POCT INR manually resulted    Anticoagulant long-term use         Paroxysmal atrial fibrillation (Multi)         Hypertension, unspecified type         History of pulmonary embolus (PE)         History of DVT (deep vein thrombosis)              Laura L Seaver, APRN-CNP    I spent a total of documented minutes on the date of service which included preparing to see the patient, face-to-face patient care, completing clinical documentation. Obtained and/or reviewed separately obtained history, counseling and education the patient/family/caregiver, and ordering medications, refills, tests, procedure or consults to specialists.

## 2025-08-06 NOTE — PROGRESS NOTES
" Anurag Cummings Jr. is a 79 y.o. here for a diabetic follow up exam.     Pt states they are doing well. Following a low carbohydrate diet and is active.     Up to date with eye and foot exams, pcp visits.     Inr today     Warfarin 7.5mg  except 2.5mg on sunday  Warfarin 7.5mg except 2.5mg on wed and sunday    Inr today   2.5mg today     2 weeks inr     Patient was counseled and educated about blood glucose targets and aic goal, carb control and meal planning, medication options, benefits and side effects, self monitoring of blood glucose if applicable, call parameters, proper subcutaneous injection techniques if applicable, and weight loss. Be active.     If we are starting a gip/glp combo or glp, patient will be sure to hydrate daily 64+oz of h20, and monitor stools. Call if any vomiting or intolerable side effects.      Lab Results   Component Value Date    POCGLU 90 08/06/2025    POCGLU 86 05/06/2025    POCGLU 118 (A) 02/12/2025    POCGLU 78 11/12/2024    POCGLU 121 (A) 08/01/2024    POCGLU 84 04/22/2024    POCGLU 84 01/24/2024    HGBA1C 6.5 08/06/2025    HGBA1C 6.3 05/06/2025    HGBA1C 6.2 02/12/2025    HGBA1C 6.1 11/12/2024    HGBA1C 6.2 08/01/2024    HGBA1C 6.1 04/22/2024    HGBA1C 6.5 01/24/2024    HGBA1C 6.6 (A) 06/29/2023     /81   Pulse 94   Temp 36.6 °C (97.9 °F) (Temporal)   Resp 16   Ht 1.803 m (5' 11\")   Wt 103 kg (226 lb)   BMI 31.52 kg/m²    Wt Readings from Last 5 Encounters:   08/06/25 103 kg (226 lb)   07/23/25 102 kg (224 lb)   06/20/25 101 kg (222 lb)   06/06/25 102 kg (225 lb)   05/06/25 102 kg (224 lb)          Lab Results   Component Value Date    ALBUMINUR <7.0 06/06/2024    CREATU 41.1 06/06/2024    MICROALBCREA  06/06/2024      Comment:      One or more analytes used in this calculation is outside of the analytical measurement range. Calculation cannot be performed.        Review of Systems     Physical Exam     Assessment & Plan  Type 2 diabetes mellitus with " hyperglycemia, without long-term current use of insulin    Orders:  •  POCT glycosylated hemoglobin (Hb A1C) manually resulted  •  POCT fingerstick glucose manually resulted  •  POCT INR manually resulted  •  Albumin-Creatinine Ratio, Urine Random    Coagulation defect (Multi)    Orders:  •  POCT INR manually resulted         Laura L Seaver, APRN-CNP     I spent a total of documented minutes on the date of service which included preparing to see the patient, face-to-face patient care, completing clinical documentation. Obtained and/or reviewed separately obtained history, counseling and education the patient/family/caregiver, and ordering medications, refills, tests, procedure or consults to specialists.

## 2025-08-07 LAB
ALBUMIN/CREAT UR: 3 MG/G CREAT
CREAT UR-MCNC: 105 MG/DL (ref 20–320)
MICROALBUMIN UR-MCNC: 0.3 MG/DL

## 2025-08-28 ENCOUNTER — PATIENT OUTREACH (OUTPATIENT)
Dept: PRIMARY CARE | Facility: CLINIC | Age: 80
End: 2025-08-28
Payer: MEDICARE

## 2025-08-28 RX ORDER — DOFETILIDE 0.25 MG/1
250 CAPSULE ORAL 2 TIMES DAILY
COMMUNITY
Start: 2025-08-27 | End: 2026-02-23

## 2025-09-10 ENCOUNTER — APPOINTMENT (OUTPATIENT)
Dept: PRIMARY CARE | Facility: CLINIC | Age: 80
End: 2025-09-10
Payer: COMMERCIAL